# Patient Record
Sex: FEMALE | Race: WHITE | HISPANIC OR LATINO | Employment: OTHER | URBAN - METROPOLITAN AREA
[De-identification: names, ages, dates, MRNs, and addresses within clinical notes are randomized per-mention and may not be internally consistent; named-entity substitution may affect disease eponyms.]

---

## 2017-05-09 ENCOUNTER — ALLSCRIPTS OFFICE VISIT (OUTPATIENT)
Dept: OTHER | Facility: OTHER | Age: 62
End: 2017-05-09

## 2017-05-09 DIAGNOSIS — M79.671 PAIN OF RIGHT FOOT: ICD-10-CM

## 2017-05-09 DIAGNOSIS — Z12.31 ENCOUNTER FOR SCREENING MAMMOGRAM FOR MALIGNANT NEOPLASM OF BREAST: ICD-10-CM

## 2017-05-10 ENCOUNTER — TRANSCRIBE ORDERS (OUTPATIENT)
Dept: RADIOLOGY | Facility: HOSPITAL | Age: 62
End: 2017-05-10

## 2017-05-10 ENCOUNTER — HOSPITAL ENCOUNTER (OUTPATIENT)
Dept: RADIOLOGY | Facility: HOSPITAL | Age: 62
Discharge: HOME | End: 2017-05-10
Payer: MEDICARE

## 2017-05-10 ENCOUNTER — GENERIC CONVERSION - ENCOUNTER (OUTPATIENT)
Dept: OTHER | Facility: OTHER | Age: 62
End: 2017-05-10

## 2017-05-10 DIAGNOSIS — M79.671 PAIN OF RIGHT FOOT: ICD-10-CM

## 2017-05-10 PROCEDURE — 73610 X-RAY EXAM OF ANKLE: CPT

## 2017-05-10 PROCEDURE — 73630 X-RAY EXAM OF FOOT: CPT

## 2017-06-05 ENCOUNTER — ALLSCRIPTS OFFICE VISIT (OUTPATIENT)
Dept: OTHER | Facility: OTHER | Age: 62
End: 2017-06-05

## 2017-06-12 ENCOUNTER — HOSPITAL ENCOUNTER (OUTPATIENT)
Dept: RADIOLOGY | Facility: HOSPITAL | Age: 62
Discharge: HOME | End: 2017-06-12
Payer: MEDICARE

## 2017-06-12 DIAGNOSIS — Z12.31 ENCOUNTER FOR SCREENING MAMMOGRAM FOR MALIGNANT NEOPLASM OF BREAST: ICD-10-CM

## 2017-06-12 PROCEDURE — G0202 SCR MAMMO BI INCL CAD: HCPCS

## 2017-06-23 ENCOUNTER — ALLSCRIPTS OFFICE VISIT (OUTPATIENT)
Dept: OTHER | Facility: OTHER | Age: 62
End: 2017-06-23

## 2017-06-23 DIAGNOSIS — M79.671 PAIN OF RIGHT FOOT: ICD-10-CM

## 2017-06-30 ENCOUNTER — HOSPITAL ENCOUNTER (OUTPATIENT)
Dept: RADIOLOGY | Facility: HOSPITAL | Age: 62
Discharge: HOME | End: 2017-06-30
Payer: MEDICARE

## 2017-06-30 DIAGNOSIS — M79.671 PAIN OF RIGHT FOOT: ICD-10-CM

## 2017-06-30 PROCEDURE — 73718 MRI LOWER EXTREMITY W/O DYE: CPT

## 2017-07-18 ENCOUNTER — TRANSCRIBE ORDERS (OUTPATIENT)
Dept: ADMINISTRATIVE | Facility: HOSPITAL | Age: 62
End: 2017-07-18

## 2017-07-18 ENCOUNTER — ALLSCRIPTS OFFICE VISIT (OUTPATIENT)
Dept: OTHER | Facility: OTHER | Age: 62
End: 2017-07-18

## 2017-07-18 DIAGNOSIS — G57.81 SAPHENOUS NEURALGIA, RIGHT: Primary | ICD-10-CM

## 2017-11-20 ENCOUNTER — ALLSCRIPTS OFFICE VISIT (OUTPATIENT)
Dept: OTHER | Facility: OTHER | Age: 62
End: 2017-11-20

## 2017-12-01 ENCOUNTER — GENERIC CONVERSION - ENCOUNTER (OUTPATIENT)
Dept: OTHER | Facility: OTHER | Age: 62
End: 2017-12-01

## 2018-01-10 NOTE — MISCELLANEOUS
Reason For Visit  Reason For Visit Free Text Note Form: Assistance with Advanced Directives and transportation programs     Case Management Documentation [facility]:   Information obtained from the patient and medical record  Other needs and concerns include psych and Depression / anxiety  She is also dealing with additional issues such as chronic/terminal disease  Patient is participating in [employer], [place], Medicare and [employer] programs  Action Plan: supportive counseling/advocacy, information provided and Bereavement Counselor/ Groups  plan reviewed  Progress Note  CHRIS met with this 57 y/o Bilingual female pt this date for assistance with info re Advanced Directives and transportation/ community resources  Pt relates she is s/p aneurysm surgery  Pt has also suffered the loss of her dgt to cancer  Pt is very interested in Advanced Directives and SW has provided info including several samples of same  Pt to f/u with her family re same  Pt has also been taking care of her [employer] arrangements as she does not want to be a burden to her remaining children when the times comes  Pt also expressed interest in Grief and Bereavement services  CHRIS was able to help her get an appointment with Rubi Zamora our Hospice Bereavement Counselor at [phone]  Info on [address] provided but it is not on a good day for her  SW was also able to help pt locates Groups on the Grief Share Group Web site  Pt also relates she had been active at [facility] for counseling services/    CHRIS has also informed her of the transportation services available with her insurance coverage  CHRIS also provided her with a [employer] application with info on how to complete same  Pt to f/u with SW as needed  Active Problems    1  Benign essential hypertension (401 1) (I10)   2  Chronic pain in right foot (729 5,338 29) (M79 671,G89 29)   3  Depression with anxiety (300 4) (F41 8)   4  Obesity (278 00) (E66 9)   5   Peripheral edema (782 3) (R60 9)   6  Posterior communicating artery aneurysm (437 3) (I67 1)   7  Sural neuritis, right (355 79) (G57 81)   8  Varicose veins (454 9) (I83 90)   9  Visit for screening mammogram (V76 12) (Z12 31)   10  Vitamin D deficiency (268 9) (E55 9)    Current Meds   1  ALPRAZolam 0 5 MG Oral Tablet (Xanax); Take 1-0 5mg tablet before leaving the house   for EMG/nerve conduction study and take 1-0 5mg tablet just before having the test;   Therapy: 48TGA4164 to (Evaluate:51Azk0737); Last Rx:63Azf0195 Ordered   2  Atenolol-Chlorthalidone 50-25 MG Oral Tablet; TAKE ONE TABLET BY MOUTH TWICE A   DAY AS DIRECTED; Therapy: 14JWM0007 to (Evaluate:87Ibc3993)  Requested for: 01MWR0200; Last   Rx:15Thn0131 Ordered   3  Losartan Potassium 100 MG Oral Tablet; TAKE 1 TABLET ONCE DAILY; Therapy: 21IYA2263 to (Ricky Safer)  Requested for: 01AIH0119; Last   ZD:[id number] Ordered    Allergies    1  Percocet TABS   2   ACE Inhibitors    Future Appointments    Date/Time Provider Specialty Site   12/01/2017 12:50 PM Arpan Guzman 6 PCP   01/18/2018 08:00 AM Salomón Melara MD Neurology [facility] 21     Signatures   Electronically signed by : Peggy Borrego LCSW; Nov 20 2017  6:06PM EST                       (Author)

## 2018-01-12 VITALS
SYSTOLIC BLOOD PRESSURE: 198 MMHG | HEIGHT: 60 IN | DIASTOLIC BLOOD PRESSURE: 102 MMHG | TEMPERATURE: 98 F | BODY MASS INDEX: 37.05 KG/M2 | HEART RATE: 84 BPM | WEIGHT: 188.71 LBS

## 2018-01-13 VITALS
BODY MASS INDEX: 36.79 KG/M2 | DIASTOLIC BLOOD PRESSURE: 70 MMHG | TEMPERATURE: 97.7 F | HEART RATE: 72 BPM | SYSTOLIC BLOOD PRESSURE: 110 MMHG | WEIGHT: 187.39 LBS | HEIGHT: 60 IN

## 2018-01-13 VITALS
SYSTOLIC BLOOD PRESSURE: 120 MMHG | HEIGHT: 59 IN | BODY MASS INDEX: 37.51 KG/M2 | WEIGHT: 186.07 LBS | DIASTOLIC BLOOD PRESSURE: 80 MMHG | TEMPERATURE: 97.9 F | HEART RATE: 70 BPM

## 2018-01-14 VITALS
HEART RATE: 76 BPM | BODY MASS INDEX: 37.69 KG/M2 | SYSTOLIC BLOOD PRESSURE: 118 MMHG | HEIGHT: 59 IN | DIASTOLIC BLOOD PRESSURE: 78 MMHG | WEIGHT: 186.95 LBS | TEMPERATURE: 98.1 F

## 2018-01-17 ENCOUNTER — APPOINTMENT (OUTPATIENT)
Dept: PHYSICAL THERAPY | Facility: REHABILITATION | Age: 63
End: 2018-01-17
Payer: COMMERCIAL

## 2018-01-17 PROCEDURE — G8979 MOBILITY GOAL STATUS: HCPCS

## 2018-01-17 PROCEDURE — 97162 PT EVAL MOD COMPLEX 30 MIN: CPT

## 2018-01-17 PROCEDURE — G8978 MOBILITY CURRENT STATUS: HCPCS

## 2018-01-24 ENCOUNTER — OFFICE VISIT (OUTPATIENT)
Dept: PHYSICAL THERAPY | Facility: REHABILITATION | Age: 63
End: 2018-01-24
Payer: COMMERCIAL

## 2018-01-24 VITALS
WEIGHT: 188.71 LBS | SYSTOLIC BLOOD PRESSURE: 110 MMHG | DIASTOLIC BLOOD PRESSURE: 70 MMHG | BODY MASS INDEX: 37.05 KG/M2 | HEIGHT: 60 IN | TEMPERATURE: 97.8 F | HEART RATE: 60 BPM

## 2018-01-24 DIAGNOSIS — M67.88 RIGHT PERONEAL TENDINOSIS: Primary | ICD-10-CM

## 2018-01-24 PROCEDURE — 97140 MANUAL THERAPY 1/> REGIONS: CPT | Performed by: PHYSICAL THERAPIST

## 2018-01-24 NOTE — PROGRESS NOTES
Daily Note     Today's date: 2018  Patient name: Ayesha Domingo  : 1955  MRN: [id number]  Referring provider: Ness Ray DPM  Dx:   Encounter Diagnosis   Name Primary?  Right peroneal tendinosis Yes         Precautions: standard    Daily Treatment Diary     Manual              Right ankle & foot PROM LMR            1st digit PROM LMR            TCJ distraction (gr 1 & 2) LMR            Fig-8 calcaneal mobs LMR                                                                                              Exercise Diary                                                                                                                                                                                                                                                                                      Modalities                                                                     Subjective: (post-tx)  "it feels much looser "      Objective: See treatment diary below      Assessment: Tolerated treatment well  Patient could benefit from continued PT      Plan: Continue per plan of care  Update HEP with calf stretching and balance  Assess ANTT

## 2018-01-26 ENCOUNTER — APPOINTMENT (OUTPATIENT)
Dept: PHYSICAL THERAPY | Facility: REHABILITATION | Age: 63
End: 2018-01-26
Payer: COMMERCIAL

## 2018-01-29 ENCOUNTER — APPOINTMENT (OUTPATIENT)
Dept: PHYSICAL THERAPY | Facility: REHABILITATION | Age: 63
End: 2018-01-29
Payer: COMMERCIAL

## 2018-02-01 ENCOUNTER — APPOINTMENT (OUTPATIENT)
Dept: PHYSICAL THERAPY | Facility: REHABILITATION | Age: 63
End: 2018-02-01
Payer: COMMERCIAL

## 2018-02-02 ENCOUNTER — APPOINTMENT (OUTPATIENT)
Dept: PHYSICAL THERAPY | Facility: REHABILITATION | Age: 63
End: 2018-02-02
Payer: COMMERCIAL

## 2018-02-02 ENCOUNTER — OFFICE VISIT (OUTPATIENT)
Dept: PHYSICAL THERAPY | Facility: REHABILITATION | Age: 63
End: 2018-02-02
Payer: COMMERCIAL

## 2018-02-02 DIAGNOSIS — M67.88 RIGHT PERONEAL TENDINOSIS: Primary | ICD-10-CM

## 2018-02-02 PROCEDURE — G8981 BODY POS CURRENT STATUS: HCPCS | Performed by: PHYSICAL THERAPIST

## 2018-02-02 PROCEDURE — 97140 MANUAL THERAPY 1/> REGIONS: CPT | Performed by: PHYSICAL THERAPIST

## 2018-02-02 PROCEDURE — G8983 BODY POS D/C STATUS: HCPCS | Performed by: PHYSICAL THERAPIST

## 2018-02-02 PROCEDURE — 97110 THERAPEUTIC EXERCISES: CPT | Performed by: PHYSICAL THERAPIST

## 2018-02-02 PROCEDURE — G8982 BODY POS GOAL STATUS: HCPCS | Performed by: PHYSICAL THERAPIST

## 2018-02-02 NOTE — PROGRESS NOTES
Daily Note     Today's date: 2018  Patient name: Milton Peter  : 1955  MRN: [id number]  Referring provider: Jayde Tinoco DPM  Dx:   Encounter Diagnosis   Name Primary?  Right peroneal tendinosis Yes       Start Time: 945  Stop Time:   Total time in clinic (min): 45 minutes    Subjective: I felt good after last tx then pain returned the next day  Objective: See treatment diary below      Assessment: Tolerated treatment well  Patient would benefit from continued PT      Plan: Continue per plan of care  and Progress treatment as tolerated       Precautions: standard     Daily Treatment Diary      Manual    2-2                   Right ankle & foot PROM LMR  LB                   1st digit PROM LMR  LB                   TCJ distraction (gr 1 & 2) LMR  LB                   Fig-8 calcaneal mobs LMR  LR                                                                                                                                                                         Exercise Diary   2-2                       bike   8 min                     slant board stretch  3x30"                     Wobble board   DF/PF/IN/EV  3x10                      Tanacross wobble  c/cc   2x20                                                                                                                                                                                                                                                                                                                                                                                                                           Modalities

## 2018-02-05 ENCOUNTER — APPOINTMENT (OUTPATIENT)
Dept: PHYSICAL THERAPY | Facility: REHABILITATION | Age: 63
End: 2018-02-05
Payer: COMMERCIAL

## 2018-02-09 ENCOUNTER — APPOINTMENT (OUTPATIENT)
Dept: PHYSICAL THERAPY | Facility: REHABILITATION | Age: 63
End: 2018-02-09
Payer: COMMERCIAL

## 2018-02-12 ENCOUNTER — APPOINTMENT (OUTPATIENT)
Dept: PHYSICAL THERAPY | Facility: REHABILITATION | Age: 63
End: 2018-02-12
Payer: COMMERCIAL

## 2018-02-19 NOTE — PROGRESS NOTES
Pt has been phoned 3x and made aware of the attendance policy and has cx and no showed greater than 3x  Pt is Dc'd at this time

## 2018-02-22 ENCOUNTER — TELEPHONE (OUTPATIENT)
Dept: INTERNAL MEDICINE CLINIC | Facility: CLINIC | Age: 63
End: 2018-02-22

## 2018-02-22 DIAGNOSIS — M25.50 ARTHRALGIA, UNSPECIFIED JOINT: ICD-10-CM

## 2018-02-22 DIAGNOSIS — R53.82 CHRONIC FATIGUE: Primary | ICD-10-CM

## 2018-02-22 RX ORDER — CHOLECALCIFEROL (VITAMIN D3) 25 MCG
1000 CAPSULE ORAL DAILY
Refills: 0 | COMMUNITY
Start: 2017-12-01 | End: 2018-07-03 | Stop reason: SDUPTHER

## 2018-02-22 RX ORDER — SERTRALINE HYDROCHLORIDE 100 MG/1
TABLET, FILM COATED ORAL
COMMUNITY
Start: 2018-02-21 | End: 2019-03-14

## 2018-02-22 RX ORDER — BIOTIN 1 MG
1 TABLET ORAL DAILY
COMMUNITY
Start: 2017-12-01 | End: 2019-03-14

## 2018-02-22 RX ORDER — LOSARTAN POTASSIUM 100 MG/1
1 TABLET ORAL DAILY
COMMUNITY
Start: 2017-05-09 | End: 2018-07-18 | Stop reason: SDUPTHER

## 2018-02-22 RX ORDER — ATENOLOL AND CHLORTHALIDONE TABLET 50; 25 MG/1; MG/1
1 TABLET ORAL 2 TIMES DAILY
COMMUNITY
Start: 2017-05-09 | End: 2018-09-14 | Stop reason: SDUPTHER

## 2018-02-22 NOTE — TELEPHONE ENCOUNTER
Advise patient there are NO labs to screen for cancer at this time, but I have ordered labs that if are abnormal would indicate there could be other issues   Please get the additional labs completed and we will call if need to be seen sooner

## 2018-02-23 ENCOUNTER — APPOINTMENT (OUTPATIENT)
Dept: PHYSICAL THERAPY | Facility: REHABILITATION | Age: 63
End: 2018-02-23
Payer: COMMERCIAL

## 2018-02-26 ENCOUNTER — APPOINTMENT (OUTPATIENT)
Dept: PHYSICAL THERAPY | Facility: REHABILITATION | Age: 63
End: 2018-02-26
Payer: COMMERCIAL

## 2018-02-28 NOTE — PROGRESS NOTES
Left reminder for patient to have her NAGA SCREEN W/REFLEX TO TITER/PATTERN, TSH 3RD GENERATION WITH T4 REFLEX, CYCLIC CITRUL PEPTIDE ANTIBODY lgG, RF SCREEN W REFLEX TITER AND CBC DIFFERENTIAL

## 2018-03-01 DIAGNOSIS — I10 ESSENTIAL (PRIMARY) HYPERTENSION: ICD-10-CM

## 2018-03-26 ENCOUNTER — APPOINTMENT (OUTPATIENT)
Dept: LAB | Facility: HOSPITAL | Age: 63
End: 2018-03-26
Payer: COMMERCIAL

## 2018-03-26 DIAGNOSIS — I10 ESSENTIAL (PRIMARY) HYPERTENSION: ICD-10-CM

## 2018-03-26 LAB
ALBUMIN SERPL BCP-MCNC: 3.4 G/DL (ref 3.5–5)
ALP SERPL-CCNC: 72 U/L (ref 46–116)
ALT SERPL W P-5'-P-CCNC: 19 U/L (ref 12–78)
ANION GAP SERPL CALCULATED.3IONS-SCNC: 5 MMOL/L (ref 4–13)
AST SERPL W P-5'-P-CCNC: 13 U/L (ref 5–45)
BASOPHILS # BLD AUTO: 0.04 THOUSANDS/ΜL (ref 0–0.1)
BASOPHILS NFR BLD AUTO: 1 % (ref 0–1)
BILIRUB SERPL-MCNC: 0.3 MG/DL (ref 0.2–1)
BUN SERPL-MCNC: 29 MG/DL (ref 5–25)
CALCIUM SERPL-MCNC: 9.2 MG/DL (ref 8.3–10.1)
CHLORIDE SERPL-SCNC: 105 MMOL/L (ref 100–108)
CHOLEST SERPL-MCNC: 196 MG/DL (ref 50–200)
CO2 SERPL-SCNC: 31 MMOL/L (ref 21–32)
CREAT SERPL-MCNC: 1.2 MG/DL (ref 0.6–1.3)
CREAT UR-MCNC: 107 MG/DL
EOSINOPHIL # BLD AUTO: 0.21 THOUSAND/ΜL (ref 0–0.61)
EOSINOPHIL NFR BLD AUTO: 3 % (ref 0–6)
ERYTHROCYTE [DISTWIDTH] IN BLOOD BY AUTOMATED COUNT: 13.4 % (ref 11.6–15.1)
GFR SERPL CREATININE-BSD FRML MDRD: 48 ML/MIN/1.73SQ M
GLUCOSE P FAST SERPL-MCNC: 100 MG/DL (ref 65–99)
HCT VFR BLD AUTO: 41.4 % (ref 34.8–46.1)
HDLC SERPL-MCNC: 47 MG/DL (ref 40–60)
HGB BLD-MCNC: 13.8 G/DL (ref 11.5–15.4)
LDLC SERPL CALC-MCNC: 121 MG/DL (ref 0–100)
LYMPHOCYTES # BLD AUTO: 2.13 THOUSANDS/ΜL (ref 0.6–4.47)
LYMPHOCYTES NFR BLD AUTO: 28 % (ref 14–44)
MCH RBC QN AUTO: 31.2 PG (ref 26.8–34.3)
MCHC RBC AUTO-ENTMCNC: 33.3 G/DL (ref 31.4–37.4)
MCV RBC AUTO: 94 FL (ref 82–98)
MICROALBUMIN UR-MCNC: <5 MG/L (ref 0–20)
MICROALBUMIN/CREAT 24H UR: <5 MG/G CREATININE (ref 0–30)
MONOCYTES # BLD AUTO: 0.4 THOUSAND/ΜL (ref 0.17–1.22)
MONOCYTES NFR BLD AUTO: 5 % (ref 4–12)
NEUTROPHILS # BLD AUTO: 4.88 THOUSANDS/ΜL (ref 1.85–7.62)
NEUTS SEG NFR BLD AUTO: 63 % (ref 43–75)
NRBC BLD AUTO-RTO: 0 /100 WBCS
PLATELET # BLD AUTO: 236 THOUSANDS/UL (ref 149–390)
PMV BLD AUTO: 11.5 FL (ref 8.9–12.7)
POTASSIUM SERPL-SCNC: 3.6 MMOL/L (ref 3.5–5.3)
PROT SERPL-MCNC: 7.3 G/DL (ref 6.4–8.2)
RBC # BLD AUTO: 4.42 MILLION/UL (ref 3.81–5.12)
RHEUMATOID FACT SER QL LA: NEGATIVE
SODIUM SERPL-SCNC: 141 MMOL/L (ref 136–145)
TRIGL SERPL-MCNC: 139 MG/DL
TSH SERPL DL<=0.05 MIU/L-ACNC: 3.71 UIU/ML (ref 0.36–3.74)
WBC # BLD AUTO: 7.69 THOUSAND/UL (ref 4.31–10.16)

## 2018-03-26 PROCEDURE — 86430 RHEUMATOID FACTOR TEST QUAL: CPT | Performed by: PHYSICIAN ASSISTANT

## 2018-03-26 PROCEDURE — 85025 COMPLETE CBC W/AUTO DIFF WBC: CPT | Performed by: PHYSICIAN ASSISTANT

## 2018-03-26 PROCEDURE — 82043 UR ALBUMIN QUANTITATIVE: CPT

## 2018-03-26 PROCEDURE — 80061 LIPID PANEL: CPT

## 2018-03-26 PROCEDURE — 86200 CCP ANTIBODY: CPT | Performed by: PHYSICIAN ASSISTANT

## 2018-03-26 PROCEDURE — 84443 ASSAY THYROID STIM HORMONE: CPT | Performed by: PHYSICIAN ASSISTANT

## 2018-03-26 PROCEDURE — 80053 COMPREHEN METABOLIC PANEL: CPT

## 2018-03-26 PROCEDURE — 86038 ANTINUCLEAR ANTIBODIES: CPT | Performed by: PHYSICIAN ASSISTANT

## 2018-03-26 PROCEDURE — 82570 ASSAY OF URINE CREATININE: CPT

## 2018-03-26 PROCEDURE — 36415 COLL VENOUS BLD VENIPUNCTURE: CPT | Performed by: PHYSICIAN ASSISTANT

## 2018-03-28 LAB
CCP IGA+IGG SERPL IA-ACNC: 4 UNITS (ref 0–19)
RYE IGE QN: NEGATIVE

## 2018-04-06 RX ORDER — RISPERIDONE 1 MG/1
1 TABLET, FILM COATED ORAL 2 TIMES DAILY
Refills: 0 | COMMUNITY
Start: 2018-03-06 | End: 2019-03-14

## 2018-04-06 RX ORDER — ZOLPIDEM TARTRATE 10 MG/1
TABLET ORAL
Refills: 0 | COMMUNITY
Start: 2018-03-01

## 2018-04-06 RX ORDER — LORAZEPAM 1 MG/1
TABLET ORAL
Refills: 0 | COMMUNITY
Start: 2018-03-01

## 2018-04-07 PROBLEM — I67.1 POSTERIOR COMMUNICATING ARTERY ANEURYSM: Status: ACTIVE | Noted: 2017-05-09

## 2018-04-07 PROBLEM — E55.9 VITAMIN D DEFICIENCY: Status: ACTIVE | Noted: 2017-05-09

## 2018-04-07 PROBLEM — Z12.11 COLON CANCER SCREENING: Status: ACTIVE | Noted: 2018-04-07

## 2018-04-07 PROBLEM — F41.8 DEPRESSION WITH ANXIETY: Status: ACTIVE | Noted: 2017-05-09

## 2018-04-07 PROBLEM — G57.81 SURAL NEURITIS, RIGHT: Status: ACTIVE | Noted: 2017-07-18

## 2018-04-07 PROBLEM — G89.29 CHRONIC PAIN IN RIGHT FOOT: Status: ACTIVE | Noted: 2017-05-09

## 2018-04-07 PROBLEM — N18.31 CKD STAGE G3A/A1, GFR 45-59 AND ALBUMIN CREATININE RATIO <30 MG/G (HCC): Status: ACTIVE | Noted: 2018-04-07

## 2018-04-07 PROBLEM — I83.90 VARICOSE VEINS: Status: ACTIVE | Noted: 2017-06-23

## 2018-04-07 PROBLEM — M79.671 CHRONIC PAIN IN RIGHT FOOT: Status: ACTIVE | Noted: 2017-05-09

## 2018-04-07 PROBLEM — M25.50 ARTHRALGIA: Status: RESOLVED | Noted: 2018-02-22 | Resolved: 2018-04-07

## 2018-04-07 PROBLEM — R53.82 CHRONIC FATIGUE: Status: RESOLVED | Noted: 2018-02-22 | Resolved: 2018-04-07

## 2018-04-07 PROBLEM — I10 BENIGN ESSENTIAL HYPERTENSION: Status: ACTIVE | Noted: 2017-05-09

## 2018-04-08 NOTE — PROGRESS NOTES
Assessment/Plan:  Do not take any NSAID or ASA  Tylenol only if needed  Continue BP meds as well controlled BP helps prevent more damage to your kidneys  Sched mammogram in June  Colonoscopy due 2021  Continue routine GYN care  Appt in 6 months, get labs completed priot to visit    Colon cancer screening  Due 11/2021       Diagnoses and all orders for this visit:    Screening for colon cancer    Essential hypertension  -     Comprehensive metabolic panel; Future  -     Protein / creatinine ratio, urine; Future    Benign essential hypertension    Depression with anxiety    Chronic pain in right foot  -     acetaminophen (TYLENOL) 500 mg tablet; Take 1 tablet (500 mg total) by mouth every 6 (six) hours as needed for moderate pain or headaches for up to 60 days    CKD stage G3a/A1, GFR 45-59 and albumin creatinine ratio <30 mg/g  -     Comprehensive metabolic panel; Future  -     Protein / creatinine ratio, urine; Future    Visit for screening mammogram  -     Mammo screening bilateral w cad; Future    Other orders  -     LORazepam (ATIVAN) 1 mg tablet; take 1 tablet by mouth daily (TAKE AS NEEDED)  -     risperiDONE (RisperDAL) 1 mg tablet; Take 1 mg by mouth 2 (two) times a day  -     zolpidem (AMBIEN) 10 mg tablet; take 1 tablet by mouth at bedtime for ( TAKE FOR SLEEP)  -     Cancel: Occult Bloood,Fecal Immunochemical; Future  -     Cancel: Flu vaccine greater than or equal to 4yo preservative free IM  -     Cancel: TDAP VACCINE GREATER THAN OR EQUAL TO 6YO IM          Subjective:      Patient ID: Magno Vee is a 61 y o  female  Pt coming in for lab review     Labs completed 3/2018  Autoimmune testing all negative   and   Glucose 100, also note BUN elevated at 29, creatinine normal at 1 2 with reduced eGFR 48  Patient had been taking a lot of ibuprofen in the past, also review shows BP has been elevated frequently in the past   Was advised in past to avoid ASA due to previous [facility] and surgery for aneurysm  Patient was cleared by neuro surgery and advised that she did not need any additional followups  Colonoscopy 11/2016 at [facility] 1 polyp, scattered diverticulosis and small internal hemorrhoids  Mammo due June 2018    Pt is going for PT for chronic foot and ankle pain  Did meet with  for assistance getting in with grief counseling  Last visit was anniversary of daughters passing  At [facility] 12/17 patient was supposed to see neurologist 1/2018 for migraines  Patient reports since her blood pressure has been controlled she has not been having any headaches  States continues to follow with  but admits taking the ativan and [place] as needed, states therapist is aware  Confirms no allergy to tylenol only had reaction to oxycodone, confirms has taken tylenol alone and no problems  Has been taking advil / ibuprofen OTC        The following portions of the patient's history were reviewed and updated as appropriate: allergies, current medications, past family history, past medical history, past social history, past surgical history and problem list     Review of Systems   Constitutional: Positive for fatigue  HENT: Negative  Respiratory: Negative  Negative for cough  Cardiovascular: Negative  Negative for chest pain and palpitations  Gastrointestinal: Negative  Skin: Negative  Neurological: Negative  Psychiatric/Behavioral: Positive for sleep disturbance  Negative for self-injury and suicidal ideas  As noted in HPI         Objective:      /74 (BP Location: Right arm, Patient Position: Sitting, Cuff Size: Large)   Pulse 84   Temp 97 5 °F (36 4 °C) (Oral)   Ht 5' 0 5" (1 537 m)   Wt 87 kg (191 lb 12 8 oz)   BMI 36 84 kg/m²          Physical Exam   Constitutional: She is oriented to person, place, and time  She appears well-developed and well-nourished  HENT:   Head: Normocephalic  Nose: Mucosal edema present     Mouth/Throat: Oropharynx is clear and moist and mucous membranes are normal  Abnormal dentition  No dental abscesses  Cardiovascular: Normal rate, regular rhythm and normal heart sounds  No murmur heard  Pulmonary/Chest: Effort normal and breath sounds normal  She has no wheezes  She has no rales  Abdominal: Soft  Bowel sounds are normal    Musculoskeletal: Normal range of motion  Neurological: She is alert and oriented to person, place, and time  Skin: Skin is warm and dry  Psychiatric: She has a normal mood and affect  Her speech is normal and behavior is normal  Judgment and thought content normal  Cognition and memory are normal    Patient did need to be redirected during the visit  Patient however could verbalized all instructions and repeat them back and understood information

## 2018-04-09 ENCOUNTER — OFFICE VISIT (OUTPATIENT)
Dept: INTERNAL MEDICINE CLINIC | Facility: CLINIC | Age: 63
End: 2018-04-09
Payer: COMMERCIAL

## 2018-04-09 VITALS
SYSTOLIC BLOOD PRESSURE: 110 MMHG | WEIGHT: 191.8 LBS | TEMPERATURE: 97.5 F | HEART RATE: 84 BPM | DIASTOLIC BLOOD PRESSURE: 74 MMHG | HEIGHT: 61 IN | BODY MASS INDEX: 36.21 KG/M2

## 2018-04-09 DIAGNOSIS — G89.29 CHRONIC PAIN IN RIGHT FOOT: ICD-10-CM

## 2018-04-09 DIAGNOSIS — F41.8 DEPRESSION WITH ANXIETY: ICD-10-CM

## 2018-04-09 DIAGNOSIS — Z12.11 SCREENING FOR COLON CANCER: Primary | ICD-10-CM

## 2018-04-09 DIAGNOSIS — N18.31 CKD STAGE G3A/A1, GFR 45-59 AND ALBUMIN CREATININE RATIO <30 MG/G (HCC): ICD-10-CM

## 2018-04-09 DIAGNOSIS — I10 BENIGN ESSENTIAL HYPERTENSION: ICD-10-CM

## 2018-04-09 DIAGNOSIS — M79.671 CHRONIC PAIN IN RIGHT FOOT: ICD-10-CM

## 2018-04-09 DIAGNOSIS — I10 ESSENTIAL HYPERTENSION: ICD-10-CM

## 2018-04-09 DIAGNOSIS — Z12.31 VISIT FOR SCREENING MAMMOGRAM: ICD-10-CM

## 2018-04-09 PROCEDURE — 3008F BODY MASS INDEX DOCD: CPT | Performed by: PHYSICIAN ASSISTANT

## 2018-04-09 PROCEDURE — 99213 OFFICE O/P EST LOW 20 MIN: CPT | Performed by: PHYSICIAN ASSISTANT

## 2018-04-09 PROCEDURE — 1036F TOBACCO NON-USER: CPT | Performed by: PHYSICIAN ASSISTANT

## 2018-04-09 RX ORDER — ACETAMINOPHEN 500 MG
500 TABLET ORAL EVERY 6 HOURS PRN
Qty: 30 TABLET | Refills: 1 | Status: SHIPPED | OUTPATIENT
Start: 2018-04-09 | End: 2018-06-08

## 2018-07-03 DIAGNOSIS — E55.9 VITAMIN D DEFICIENCY: Primary | ICD-10-CM

## 2018-07-03 RX ORDER — CHOLECALCIFEROL (VITAMIN D3) 25 MCG
1000 CAPSULE ORAL DAILY
Qty: 90 CAPSULE | Refills: 1 | Status: SHIPPED | OUTPATIENT
Start: 2018-07-03 | End: 2019-03-14

## 2018-07-18 DIAGNOSIS — I10 ESSENTIAL HYPERTENSION: Primary | ICD-10-CM

## 2018-07-18 RX ORDER — LOSARTAN POTASSIUM 100 MG/1
TABLET ORAL
Qty: 90 TABLET | Refills: 1 | Status: SHIPPED | OUTPATIENT
Start: 2018-07-18 | End: 2019-03-14

## 2018-08-27 ENCOUNTER — TRANSCRIBE ORDERS (OUTPATIENT)
Dept: RADIOLOGY | Facility: HOSPITAL | Age: 63
End: 2018-08-27

## 2018-08-27 ENCOUNTER — HOSPITAL ENCOUNTER (OUTPATIENT)
Dept: RADIOLOGY | Facility: HOSPITAL | Age: 63
Discharge: HOME | End: 2018-08-27
Payer: COMMERCIAL

## 2018-08-27 ENCOUNTER — TRANSCRIBE ORDERS (OUTPATIENT)
Dept: LAB | Facility: HOSPITAL | Age: 63
End: 2018-08-27

## 2018-08-27 ENCOUNTER — LAB (OUTPATIENT)
Dept: LAB | Facility: HOSPITAL | Age: 63
End: 2018-08-27
Payer: COMMERCIAL

## 2018-08-27 ENCOUNTER — OFFICE VISIT (OUTPATIENT)
Dept: LAB | Facility: HOSPITAL | Age: 63
End: 2018-08-27
Payer: COMMERCIAL

## 2018-08-27 DIAGNOSIS — R07.9 CHEST PAIN, UNSPECIFIED TYPE: ICD-10-CM

## 2018-08-27 DIAGNOSIS — E66.9 OBESITY, UNSPECIFIED CLASSIFICATION, UNSPECIFIED OBESITY TYPE, UNSPECIFIED WHETHER SERIOUS COMORBIDITY PRESENT: ICD-10-CM

## 2018-08-27 DIAGNOSIS — I10 ESSENTIAL HYPERTENSION, MALIGNANT: ICD-10-CM

## 2018-08-27 DIAGNOSIS — I10 BENIGN HYPERTENSION: Primary | ICD-10-CM

## 2018-08-27 DIAGNOSIS — E66.9 OBESITY, UNSPECIFIED CLASSIFICATION, UNSPECIFIED OBESITY TYPE, UNSPECIFIED WHETHER SERIOUS COMORBIDITY PRESENT: Primary | ICD-10-CM

## 2018-08-27 LAB
ALBUMIN SERPL BCP-MCNC: 3.4 G/DL (ref 3.5–5)
ALP SERPL-CCNC: 63 U/L (ref 46–116)
ALT SERPL W P-5'-P-CCNC: 18 U/L (ref 12–78)
ANION GAP SERPL CALCULATED.3IONS-SCNC: 7 MMOL/L (ref 4–13)
AST SERPL W P-5'-P-CCNC: 16 U/L (ref 5–45)
ATRIAL RATE: 49 BPM
BACTERIA UR QL AUTO: ABNORMAL /HPF
BASOPHILS # BLD AUTO: 0.06 THOUSANDS/ΜL (ref 0–0.1)
BASOPHILS NFR BLD AUTO: 1 % (ref 0–1)
BILIRUB SERPL-MCNC: 0.46 MG/DL (ref 0.2–1)
BILIRUB UR QL STRIP: NEGATIVE
BUN SERPL-MCNC: 23 MG/DL (ref 5–25)
CALCIUM SERPL-MCNC: 8.9 MG/DL (ref 8.3–10.1)
CHLORIDE SERPL-SCNC: 104 MMOL/L (ref 100–108)
CHOLEST SERPL-MCNC: 211 MG/DL (ref 50–200)
CLARITY UR: ABNORMAL
CO2 SERPL-SCNC: 28 MMOL/L (ref 21–32)
COLOR UR: ABNORMAL
CREAT SERPL-MCNC: 1.21 MG/DL (ref 0.6–1.3)
EOSINOPHIL # BLD AUTO: 0.22 THOUSAND/ΜL (ref 0–0.61)
EOSINOPHIL NFR BLD AUTO: 3 % (ref 0–6)
ERYTHROCYTE [DISTWIDTH] IN BLOOD BY AUTOMATED COUNT: 13.2 % (ref 11.6–15.1)
EST. AVERAGE GLUCOSE BLD GHB EST-MCNC: 117 MG/DL
GFR SERPL CREATININE-BSD FRML MDRD: 48 ML/MIN/1.73SQ M
GLUCOSE P FAST SERPL-MCNC: 96 MG/DL (ref 65–99)
GLUCOSE UR STRIP-MCNC: NEGATIVE MG/DL
HBA1C MFR BLD: 5.7 % (ref 4.2–6.3)
HCT VFR BLD AUTO: 41.2 % (ref 34.8–46.1)
HDLC SERPL-MCNC: 45 MG/DL (ref 40–60)
HGB BLD-MCNC: 13.4 G/DL (ref 11.5–15.4)
HGB UR QL STRIP.AUTO: NEGATIVE
HYALINE CASTS #/AREA URNS LPF: ABNORMAL /LPF
IMM GRANULOCYTES # BLD AUTO: 0.03 THOUSAND/UL (ref 0–0.2)
IMM GRANULOCYTES NFR BLD AUTO: 0 % (ref 0–2)
KETONES UR STRIP-MCNC: NEGATIVE MG/DL
LDLC SERPL CALC-MCNC: 138 MG/DL (ref 0–100)
LEUKOCYTE ESTERASE UR QL STRIP: ABNORMAL
LYMPHOCYTES # BLD AUTO: 1.58 THOUSANDS/ΜL (ref 0.6–4.47)
LYMPHOCYTES NFR BLD AUTO: 21 % (ref 14–44)
MCH RBC QN AUTO: 30.7 PG (ref 26.8–34.3)
MCHC RBC AUTO-ENTMCNC: 32.5 G/DL (ref 31.4–37.4)
MCV RBC AUTO: 95 FL (ref 82–98)
MONOCYTES # BLD AUTO: 0.63 THOUSAND/ΜL (ref 0.17–1.22)
MONOCYTES NFR BLD AUTO: 9 % (ref 4–12)
NEUTROPHILS # BLD AUTO: 4.91 THOUSANDS/ΜL (ref 1.85–7.62)
NEUTS SEG NFR BLD AUTO: 66 % (ref 43–75)
NITRITE UR QL STRIP: NEGATIVE
NON-SQ EPI CELLS URNS QL MICRO: ABNORMAL /HPF
NONHDLC SERPL-MCNC: 166 MG/DL
NRBC BLD AUTO-RTO: 0 /100 WBCS
P AXIS: 48 DEGREES
PH UR STRIP.AUTO: 5.5 [PH] (ref 4.5–8)
PLATELET # BLD AUTO: 210 THOUSANDS/UL (ref 149–390)
PMV BLD AUTO: 11.7 FL (ref 8.9–12.7)
POTASSIUM SERPL-SCNC: 2.9 MMOL/L (ref 3.5–5.3)
PR INTERVAL: 172 MS
PROT SERPL-MCNC: 7.1 G/DL (ref 6.4–8.2)
PROT UR STRIP-MCNC: NEGATIVE MG/DL
QRS AXIS: 5 DEGREES
QRSD INTERVAL: 90 MS
QT INTERVAL: 446 MS
QTC INTERVAL: 402 MS
RBC # BLD AUTO: 4.36 MILLION/UL (ref 3.81–5.12)
RBC #/AREA URNS AUTO: ABNORMAL /HPF
SODIUM SERPL-SCNC: 139 MMOL/L (ref 136–145)
SP GR UR STRIP.AUTO: 1.02 (ref 1–1.03)
T WAVE AXIS: 15 DEGREES
TRIGL SERPL-MCNC: 140 MG/DL
UROBILINOGEN UR QL STRIP.AUTO: 0.2 E.U./DL
VENTRICULAR RATE: 49 BPM
WBC # BLD AUTO: 7.43 THOUSAND/UL (ref 4.31–10.16)
WBC #/AREA URNS AUTO: ABNORMAL /HPF

## 2018-08-27 PROCEDURE — 81001 URINALYSIS AUTO W/SCOPE: CPT

## 2018-08-27 PROCEDURE — 93010 ELECTROCARDIOGRAM REPORT: CPT | Performed by: INTERNAL MEDICINE

## 2018-08-27 PROCEDURE — 80061 LIPID PANEL: CPT

## 2018-08-27 PROCEDURE — 36415 COLL VENOUS BLD VENIPUNCTURE: CPT

## 2018-08-27 PROCEDURE — 71046 X-RAY EXAM CHEST 2 VIEWS: CPT

## 2018-08-27 PROCEDURE — 93005 ELECTROCARDIOGRAM TRACING: CPT

## 2018-08-27 PROCEDURE — 85025 COMPLETE CBC W/AUTO DIFF WBC: CPT

## 2018-08-27 PROCEDURE — 83036 HEMOGLOBIN GLYCOSYLATED A1C: CPT

## 2018-08-27 PROCEDURE — 80053 COMPREHEN METABOLIC PANEL: CPT

## 2018-09-05 ENCOUNTER — TRANSCRIBE ORDERS (OUTPATIENT)
Dept: ADMINISTRATIVE | Facility: HOSPITAL | Age: 63
End: 2018-09-05

## 2018-09-05 DIAGNOSIS — R94.31 ABNORMAL EKG: Primary | ICD-10-CM

## 2018-09-10 ENCOUNTER — HOSPITAL ENCOUNTER (OUTPATIENT)
Dept: RADIOLOGY | Facility: HOSPITAL | Age: 63
Discharge: HOME | End: 2018-09-10
Payer: COMMERCIAL

## 2018-09-10 ENCOUNTER — APPOINTMENT (OUTPATIENT)
Dept: LAB | Facility: HOSPITAL | Age: 63
End: 2018-09-10
Payer: COMMERCIAL

## 2018-09-10 DIAGNOSIS — R94.31 ABNORMAL EKG: ICD-10-CM

## 2018-09-10 DIAGNOSIS — R10.11 ABDOMINAL PAIN, RIGHT UPPER QUADRANT: ICD-10-CM

## 2018-09-10 LAB — MAGNESIUM SERPL-MCNC: 2.3 MG/DL (ref 1.6–2.6)

## 2018-09-10 PROCEDURE — 36415 COLL VENOUS BLD VENIPUNCTURE: CPT

## 2018-09-10 PROCEDURE — 76705 ECHO EXAM OF ABDOMEN: CPT

## 2018-09-10 PROCEDURE — 83735 ASSAY OF MAGNESIUM: CPT

## 2018-09-14 DIAGNOSIS — I10 ESSENTIAL HYPERTENSION: Primary | ICD-10-CM

## 2018-09-14 RX ORDER — ATENOLOL AND CHLORTHALIDONE TABLET 50; 25 MG/1; MG/1
TABLET ORAL
Qty: 180 TABLET | Refills: 3 | Status: SHIPPED | OUTPATIENT
Start: 2018-09-14 | End: 2019-03-14

## 2018-09-25 ENCOUNTER — HOSPITAL ENCOUNTER (OUTPATIENT)
Dept: NON INVASIVE DIAGNOSTICS | Facility: HOSPITAL | Age: 63
Discharge: HOME | End: 2018-09-25
Payer: COMMERCIAL

## 2018-09-25 DIAGNOSIS — R94.31 ABNORMAL EKG: ICD-10-CM

## 2018-09-25 PROCEDURE — 93226 XTRNL ECG REC<48 HR SCAN A/R: CPT

## 2018-09-25 PROCEDURE — 93225 XTRNL ECG REC<48 HRS REC: CPT

## 2018-09-25 PROCEDURE — 93306 TTE W/DOPPLER COMPLETE: CPT | Performed by: INTERNAL MEDICINE

## 2018-09-25 PROCEDURE — 93306 TTE W/DOPPLER COMPLETE: CPT

## 2018-09-26 PROCEDURE — 93227 XTRNL ECG REC<48 HR R&I: CPT | Performed by: INTERNAL MEDICINE

## 2018-10-11 ENCOUNTER — TELEPHONE (OUTPATIENT)
Dept: INTERNAL MEDICINE CLINIC | Facility: CLINIC | Age: 63
End: 2018-10-11

## 2018-10-12 ENCOUNTER — TRANSCRIBE ORDERS (OUTPATIENT)
Dept: RADIOLOGY | Facility: HOSPITAL | Age: 63
End: 2018-10-12

## 2018-10-12 ENCOUNTER — HOSPITAL ENCOUNTER (OUTPATIENT)
Dept: RADIOLOGY | Facility: HOSPITAL | Age: 63
Discharge: HOME | End: 2018-10-12
Payer: COMMERCIAL

## 2018-10-12 DIAGNOSIS — M54.9 BACK PAIN, UNSPECIFIED BACK LOCATION, UNSPECIFIED BACK PAIN LATERALITY, UNSPECIFIED CHRONICITY: Primary | ICD-10-CM

## 2018-10-12 DIAGNOSIS — M54.9 BACK PAIN, UNSPECIFIED BACK LOCATION, UNSPECIFIED BACK PAIN LATERALITY, UNSPECIFIED CHRONICITY: ICD-10-CM

## 2018-10-12 PROCEDURE — 72114 X-RAY EXAM L-S SPINE BENDING: CPT

## 2018-11-21 ENCOUNTER — TRANSCRIBE ORDERS (OUTPATIENT)
Dept: ADMINISTRATIVE | Facility: HOSPITAL | Age: 63
End: 2018-11-21

## 2018-11-21 DIAGNOSIS — I67.1 CEREBRAL ANEURYSM, NONRUPTURED: ICD-10-CM

## 2018-11-21 DIAGNOSIS — R55 SYNCOPE AND COLLAPSE: Primary | ICD-10-CM

## 2018-11-21 DIAGNOSIS — R55 NEAR SYNCOPE: Primary | ICD-10-CM

## 2018-12-05 ENCOUNTER — HOSPITAL ENCOUNTER (OUTPATIENT)
Dept: NON INVASIVE DIAGNOSTICS | Facility: CLINIC | Age: 63
Discharge: HOME | End: 2018-12-05
Payer: COMMERCIAL

## 2018-12-05 DIAGNOSIS — I67.1 CEREBRAL ANEURYSM, NONRUPTURED: ICD-10-CM

## 2018-12-05 DIAGNOSIS — R55 NEAR SYNCOPE: ICD-10-CM

## 2018-12-05 PROCEDURE — 93880 EXTRACRANIAL BILAT STUDY: CPT

## 2018-12-05 PROCEDURE — 93880 EXTRACRANIAL BILAT STUDY: CPT | Performed by: RADIOLOGY

## 2018-12-06 ENCOUNTER — TRANSCRIBE ORDERS (OUTPATIENT)
Dept: LAB | Facility: HOSPITAL | Age: 63
End: 2018-12-06

## 2018-12-06 ENCOUNTER — APPOINTMENT (OUTPATIENT)
Dept: LAB | Facility: HOSPITAL | Age: 63
End: 2018-12-06
Payer: COMMERCIAL

## 2018-12-06 DIAGNOSIS — R55 SYNCOPE AND COLLAPSE: Primary | ICD-10-CM

## 2018-12-06 DIAGNOSIS — I67.1 CEREBRAL ANEURYSM, NONRUPTURED: ICD-10-CM

## 2018-12-06 LAB
ANION GAP SERPL CALCULATED.3IONS-SCNC: 5 MMOL/L (ref 4–13)
BUN SERPL-MCNC: 17 MG/DL (ref 5–25)
CALCIUM SERPL-MCNC: 9.7 MG/DL (ref 8.3–10.1)
CHLORIDE SERPL-SCNC: 108 MMOL/L (ref 100–108)
CO2 SERPL-SCNC: 25 MMOL/L (ref 21–32)
CREAT SERPL-MCNC: 1.03 MG/DL (ref 0.6–1.3)
GFR SERPL CREATININE-BSD FRML MDRD: 58 ML/MIN/1.73SQ M
GLUCOSE P FAST SERPL-MCNC: 96 MG/DL (ref 65–99)
POTASSIUM SERPL-SCNC: 4 MMOL/L (ref 3.5–5.3)
SODIUM SERPL-SCNC: 138 MMOL/L (ref 136–145)

## 2018-12-06 PROCEDURE — 36415 COLL VENOUS BLD VENIPUNCTURE: CPT

## 2018-12-06 PROCEDURE — 80048 BASIC METABOLIC PNL TOTAL CA: CPT

## 2018-12-11 ENCOUNTER — HOSPITAL ENCOUNTER (OUTPATIENT)
Dept: RADIOLOGY | Facility: HOSPITAL | Age: 63
Discharge: HOME | End: 2018-12-11
Payer: COMMERCIAL

## 2018-12-11 DIAGNOSIS — R55 SYNCOPE AND COLLAPSE: ICD-10-CM

## 2018-12-11 DIAGNOSIS — I67.1 CEREBRAL ANEURYSM, NONRUPTURED: ICD-10-CM

## 2018-12-11 PROCEDURE — A9585 GADOBUTROL INJECTION: HCPCS | Performed by: INTERNAL MEDICINE

## 2018-12-11 PROCEDURE — 70553 MRI BRAIN STEM W/O & W/DYE: CPT

## 2018-12-11 RX ADMIN — GADOBUTROL 10 ML: 604.72 INJECTION INTRAVENOUS at 12:52

## 2019-01-14 ENCOUNTER — TRANSCRIBE ORDERS (OUTPATIENT)
Dept: ADMINISTRATIVE | Facility: HOSPITAL | Age: 64
End: 2019-01-14

## 2019-01-14 DIAGNOSIS — Z12.39 SCREENING BREAST EXAMINATION: Primary | ICD-10-CM

## 2019-03-14 ENCOUNTER — HOSPITAL ENCOUNTER (EMERGENCY)
Facility: HOSPITAL | Age: 64
Discharge: HOME | End: 2019-03-14
Attending: EMERGENCY MEDICINE
Payer: COMMERCIAL

## 2019-03-14 VITALS
RESPIRATION RATE: 20 BRPM | TEMPERATURE: 98.6 F | HEART RATE: 57 BPM | SYSTOLIC BLOOD PRESSURE: 192 MMHG | DIASTOLIC BLOOD PRESSURE: 86 MMHG | WEIGHT: 192 LBS | OXYGEN SATURATION: 98 % | HEIGHT: 55 IN | BODY MASS INDEX: 44.43 KG/M2

## 2019-03-14 DIAGNOSIS — M79.89 LEG SWELLING: ICD-10-CM

## 2019-03-14 DIAGNOSIS — M54.9 BACK PAIN: Primary | ICD-10-CM

## 2019-03-14 LAB
ALBUMIN SERPL BCP-MCNC: 3.3 G/DL (ref 3.5–5)
ALP SERPL-CCNC: 80 U/L (ref 46–116)
ALT SERPL W P-5'-P-CCNC: 21 U/L (ref 12–78)
ANION GAP SERPL CALCULATED.3IONS-SCNC: 5 MMOL/L (ref 4–13)
AST SERPL W P-5'-P-CCNC: 39 U/L (ref 5–45)
ATRIAL RATE: 58 BPM
BASOPHILS # BLD AUTO: 0.06 THOUSANDS/ΜL (ref 0–0.1)
BASOPHILS NFR BLD AUTO: 1 % (ref 0–1)
BILIRUB SERPL-MCNC: 0.42 MG/DL (ref 0.2–1)
BUN SERPL-MCNC: 16 MG/DL (ref 5–25)
CALCIUM SERPL-MCNC: 8.7 MG/DL (ref 8.3–10.1)
CHLORIDE SERPL-SCNC: 110 MMOL/L (ref 100–108)
CO2 SERPL-SCNC: 26 MMOL/L (ref 21–32)
CREAT SERPL-MCNC: 0.97 MG/DL (ref 0.6–1.3)
EOSINOPHIL # BLD AUTO: 0.23 THOUSAND/ΜL (ref 0–0.61)
EOSINOPHIL NFR BLD AUTO: 3 % (ref 0–6)
ERYTHROCYTE [DISTWIDTH] IN BLOOD BY AUTOMATED COUNT: 13.2 % (ref 11.6–15.1)
GFR SERPL CREATININE-BSD FRML MDRD: 62 ML/MIN/1.73SQ M
GLUCOSE SERPL-MCNC: 90 MG/DL (ref 65–140)
HCT VFR BLD AUTO: 40.8 % (ref 34.8–46.1)
HGB BLD-MCNC: 13.3 G/DL (ref 11.5–15.4)
IMM GRANULOCYTES # BLD AUTO: 0.03 THOUSAND/UL (ref 0–0.2)
IMM GRANULOCYTES NFR BLD AUTO: 0 % (ref 0–2)
LYMPHOCYTES # BLD AUTO: 1.87 THOUSANDS/ΜL (ref 0.6–4.47)
LYMPHOCYTES NFR BLD AUTO: 25 % (ref 14–44)
MCH RBC QN AUTO: 31.2 PG (ref 26.8–34.3)
MCHC RBC AUTO-ENTMCNC: 32.6 G/DL (ref 31.4–37.4)
MCV RBC AUTO: 96 FL (ref 82–98)
MONOCYTES # BLD AUTO: 0.65 THOUSAND/ΜL (ref 0.17–1.22)
MONOCYTES NFR BLD AUTO: 9 % (ref 4–12)
NEUTROPHILS # BLD AUTO: 4.6 THOUSANDS/ΜL (ref 1.85–7.62)
NEUTS SEG NFR BLD AUTO: 62 % (ref 43–75)
NRBC BLD AUTO-RTO: 0 /100 WBCS
P AXIS: 56 DEGREES
PLATELET # BLD AUTO: 195 THOUSANDS/UL (ref 149–390)
PMV BLD AUTO: 11.7 FL (ref 8.9–12.7)
POTASSIUM SERPL-SCNC: 4.8 MMOL/L (ref 3.5–5.3)
PR INTERVAL: 156 MS
PROT SERPL-MCNC: 7 G/DL (ref 6.4–8.2)
QRS AXIS: -8 DEGREES
QRSD INTERVAL: 86 MS
QT INTERVAL: 418 MS
QTC INTERVAL: 410 MS
RBC # BLD AUTO: 4.26 MILLION/UL (ref 3.81–5.12)
SODIUM SERPL-SCNC: 141 MMOL/L (ref 136–145)
T WAVE AXIS: 44 DEGREES
TROPONIN I SERPL-MCNC: <0.02 NG/ML
VENTRICULAR RATE: 58 BPM
WBC # BLD AUTO: 7.44 THOUSAND/UL (ref 4.31–10.16)

## 2019-03-14 PROCEDURE — 85025 COMPLETE CBC W/AUTO DIFF WBC: CPT | Performed by: EMERGENCY MEDICINE

## 2019-03-14 PROCEDURE — 93010 ELECTROCARDIOGRAM REPORT: CPT | Performed by: INTERNAL MEDICINE

## 2019-03-14 PROCEDURE — 93005 ELECTROCARDIOGRAM TRACING: CPT

## 2019-03-14 PROCEDURE — 84484 ASSAY OF TROPONIN QUANT: CPT | Performed by: EMERGENCY MEDICINE

## 2019-03-14 PROCEDURE — 36415 COLL VENOUS BLD VENIPUNCTURE: CPT | Performed by: EMERGENCY MEDICINE

## 2019-03-14 PROCEDURE — 80053 COMPREHEN METABOLIC PANEL: CPT | Performed by: EMERGENCY MEDICINE

## 2019-03-14 PROCEDURE — 99283 EMERGENCY DEPT VISIT LOW MDM: CPT

## 2019-03-14 NOTE — ED PROVIDER NOTES
History  Chief Complaint   Patient presents with    Leg Swelling     Pt presents with bilateral leg swelling  Pt reports "this has been this way for 2 months "     Back Pain     Pt reports lower back pain  Pt denies any previous injury to her back  This is a 60-year-old female with a history of chronic kidney disease, subarachnoid hemorrhage status post clipping, hypertension who presents with bilateral lower extremity edema and back pain  Over the past 2 months, the patient has been experiencing bilateral lower extremity pain with mild swelling  She is also complaining of back pain described as sharp, constant, lumbar radiating to thoracic, without any associated symptoms  Pain is exacerbated by standing and walking  Denies any alleviating factors  States that she tried elevating her legs without relief of the swelling and pain  States that elevating her legs just makes her back pain worse  Denies fever, saddle anesthesia, bowel/bladder issues, lower extremity numbness/weakness, history of IV drug use, history of cancer, bleeding issues, recent spinal instrumentation  Denies any weight gain  Patient states that she has seen her primary care doctor approximately 1 month ago for her symptoms  Patient does state that her daughter  from breast cancer  No recent breast exam or mammogram per patient  States that she needs to schedule a mammogram   Denies fever/chills, nausea/vomiting, lightheadedness/dizziness, numbness/weakness, headache, change in vision, URI symptoms, neck pain, chest pain, palpitations, shortness of breath, cough, flank pain, abdominal pain, diarrhea, hematochezia, melena, dysuria, hematuria, abnormal vaginal discharge/bleeding  Prior to Admission Medications   Prescriptions Last Dose Informant Patient Reported? Taking?    LORazepam (ATIVAN) 1 mg tablet 3/13/2019 at Unknown time  Yes Yes   Sig: take 1 tablet by mouth daily (TAKE AS NEEDED)   MAPAP 500 MG tablet 3/14/2019 at Unknown time  Yes Yes   Sig: take 2 tablets (1,000 MG) by mouth every 8 hours if needed for pain   atenolol (TENORMIN) 50 mg tablet 3/14/2019 at Unknown time  Yes Yes   omeprazole (PriLOSEC) 20 mg delayed release capsule 3/13/2019 at Unknown time  Yes Yes   Sig: take 1 capsule (20 MG) by mouth once daily A 1/2 AN HOUR BEFORE B   (REFER TO PRESCRIPTION NOTES)  zolpidem (AMBIEN) 10 mg tablet 3/13/2019 at Unknown time  Yes Yes   Sig: take 1 tablet by mouth at bedtime for ( TAKE FOR SLEEP)      Facility-Administered Medications: None       Past Medical History:   Diagnosis Date    Peripheral edema     last assessed 06/23/17       Past Surgical History:   Procedure Laterality Date    BLADDER SURGERY      last assessed 05/09/17    ELBOW SURGERY      last assessed 05/09/17    FOOT SURGERY      last assessed 05/09/17    INTRACRANIAL ANEURYSM REPAIR      last assessed 05/09/17       Family History   Adopted: Yes   Family history unknown: Yes     I have reviewed and agree with the history as documented  Social History     Tobacco Use    Smoking status: Former Smoker    Smokeless tobacco: Former User   Substance Use Topics    Alcohol use: No    Drug use: No        Review of Systems   Constitutional: Negative for chills and fever  HENT: Negative for rhinorrhea, sore throat and trouble swallowing  Eyes: Negative for photophobia and visual disturbance  Respiratory: Negative for cough, chest tightness and shortness of breath  Cardiovascular: Negative for chest pain, palpitations and leg swelling  Gastrointestinal: Negative for abdominal pain, blood in stool, diarrhea, nausea and vomiting  Endocrine: Negative for polyuria  Genitourinary: Negative for dysuria, flank pain, hematuria, vaginal bleeding and vaginal discharge  Musculoskeletal: Positive for arthralgias and back pain  Negative for neck pain  Skin: Negative for color change and rash     Allergic/Immunologic: Negative for immunocompromised state  Neurological: Negative for dizziness, weakness, light-headedness, numbness and headaches  All other systems reviewed and are negative  Physical Exam  ED Triage Vitals   Temperature Pulse Respirations Blood Pressure SpO2   03/14/19 1415 03/14/19 1415 03/14/19 1415 03/14/19 1415 03/14/19 1415   98 6 °F (37 °C) 71 18 (!) 206/86 94 %      Temp Source Heart Rate Source Patient Position - Orthostatic VS BP Location FiO2 (%)   03/14/19 1415 03/14/19 1500 03/14/19 1415 03/14/19 1415 --   Tympanic Monitor Sitting Left arm       Pain Score       03/14/19 1415       8           qSOFA     Row Name 03/14/19 1630 03/14/19 1500 03/14/19 1456 03/14/19 1415       Altered mental status GCS < 15      0       Respiratory Rate > / =22  0  0    0     Systolic BP < / =066  0  0    0     Q Sofa Score  0  0  0  0           Orthostatic Vital Signs  Vitals:    03/14/19 1415 03/14/19 1500 03/14/19 1630   BP: (!) 206/86 (!) 214/89 (!) 192/86   Pulse: 71 58 57   Patient Position - Orthostatic VS: Sitting  Lying       Physical Exam   Constitutional: Vital signs are normal  She appears well-developed  She is cooperative  No distress  HENT:   Mouth/Throat: Uvula is midline, oropharynx is clear and moist and mucous membranes are normal    Eyes: Pupils are equal, round, and reactive to light  Conjunctivae and EOM are normal    Neck: Trachea normal  No thyroid mass and no thyromegaly present  Cardiovascular: Normal rate, regular rhythm, normal heart sounds, intact distal pulses and normal pulses  No murmur heard  Pulmonary/Chest: Effort normal and breath sounds normal  Right breast exhibits no inverted nipple, no mass, no nipple discharge, no skin change and no tenderness  Left breast exhibits no inverted nipple, no mass, no nipple discharge, no skin change and no tenderness  No breast swelling, tenderness, discharge or bleeding  Breasts are symmetrical    Abdominal: Soft   Normal appearance and bowel sounds are normal  There is no tenderness  There is no rebound, no guarding and no CVA tenderness  Musculoskeletal:   Mild swelling noted to bilateral lower extremities  No calf tenderness or erythema  Varicose veins noted throughout bilateral lower extremities  Mildly tender in the right thoracic rib cage area  Neurological: She is alert  She has normal strength and normal reflexes  No sensory deficit  Reflex Scores:       Bicep reflexes are 2+ on the right side and 2+ on the left side  Patient able to toe walk and heel walk  Skin: Skin is warm, dry and intact  Psychiatric: She has a normal mood and affect  Her speech is normal and behavior is normal  Thought content normal        ED Medications  Medications - No data to display    Diagnostic Studies  Results Reviewed     Procedure Component Value Units Date/Time    Comprehensive metabolic panel [[id number]]  (Abnormal) Collected:  03/14/19 1453    Lab Status:  Final result Specimen:  Blood from Arm, Left Updated:  03/14/19 1524     Sodium 141 mmol/L      Potassium 4 8 mmol/L      Chloride 110 mmol/L      CO2 26 mmol/L      ANION GAP 5 mmol/L      BUN 16 mg/dL      Creatinine 0 97 mg/dL      Glucose 90 mg/dL      Calcium 8 7 mg/dL      AST 39 U/L      ALT 21 U/L      Alkaline Phosphatase 80 U/L      Total Protein 7 0 g/dL      Albumin 3 3 g/dL      Total Bilirubin 0 42 mg/dL      eGFR 62 ml/min/1 73sq m     Narrative:       National Kidney Disease Education Program recommendations are as follows:  GFR calculation is accurate only with a steady state creatinine  Chronic Kidney disease less than 60 ml/min/1 73 sq  meters  Kidney failure less than 15 ml/min/1 73 sq  meters      Troponin I [[id number]]  (Normal) Collected:  03/14/19 1453    Lab Status:  Final result Specimen:  Blood from Arm, Left Updated:  03/14/19 1522     Troponin I <0 02 ng/mL     CBC and differential [[id number]] Collected:  03/14/19 1453    Lab Status:  Final result Specimen:  Blood from Arm, Left Updated:  03/14/19 1459     WBC 7 44 Thousand/uL      RBC 4 26 Million/uL      Hemoglobin 13 3 g/dL      Hematocrit 40 8 %      MCV 96 fL      MCH 31 2 pg      MCHC 32 6 g/dL      RDW 13 2 %      MPV 11 7 fL      Platelets 150 Thousands/uL      nRBC 0 /100 WBCs      Neutrophils Relative 62 %      Immat GRANS % 0 %      Lymphocytes Relative 25 %      Monocytes Relative 9 %      Eosinophils Relative 3 %      Basophils Relative 1 %      Neutrophils Absolute 4 60 Thousands/µL      Immature Grans Absolute 0 03 Thousand/uL      Lymphocytes Absolute 1 87 Thousands/µL      Monocytes Absolute 0 65 Thousand/µL      Eosinophils Absolute 0 23 Thousand/µL      Basophils Absolute 0 06 Thousands/µL                  No orders to display         Procedures  ECG 12 Lead Documentation  Date/Time: 3/14/2019 2:59 PM  Performed by: Ben Vasquez MD  Authorized by: Ben Vasquez MD     ECG reviewed by me, the ED Provider: yes    Patient location:  ED  Previous ECG:     Previous ECG:  Compared to current    Similarity:  No change  Interpretation:     Interpretation: abnormal    Rate:     ECG rate:  58    ECG rate assessment: bradycardic    Rhythm:     Rhythm: sinus bradycardia    Ectopy:     Ectopy: none    QRS:     QRS axis:  Normal    QRS intervals:  Normal  Conduction:     Conduction: normal    ST segments:     ST segments:  Normal  T waves:     T waves: normal            Phone Consults  ED Phone Contact    ED Course  ED Course as of Mar 14 1706   Thu Mar 14, 2019   1700 Normal bedside ultrasound of bilateral lower extremities  Images in Q path  MDM  Number of Diagnoses or Management Options  Diagnosis management comments: Will check basic lab work  Reassuring breast exam   Follow up with primary care doctor        Disposition  Final diagnoses:   Back pain   Leg swelling     Time reflects when diagnosis was documented in both MDM as applicable and the Disposition within this note Time User Action Codes Description Comment    3/14/2019  4:59 PM Mary Flair Add [M54 9] Back pain     3/14/2019  4:59 PM [place] Flair Add [M79 89] Leg swelling       ED Disposition     ED Disposition Condition Date/Time Comment    Discharge Stable Thu Mar 14, 2019  4:59 PM Carson Pat discharge to home/self care  Follow-up Information     Follow up With Specialties Details Why Contact Info Additional Valeria 39, CRNP Nurse Practitioner Schedule an appointment as soon as possible for a visit  for follow up for repeat breast exam and mammography follow up  Also, follow up from today's visit  [facility] [facility] Emergency Department Emergency Medicine Go to  If symptoms worsen [address]  [phone]  ED, [address] [place] [address], [phone]          Patient's Medications   Discharge Prescriptions    No medications on file     No discharge procedures on file  ED Provider  Attending physically available and evaluated Carson Pat I managed the patient along with the ED Attending      Electronically Signed by         Spenser Bower MD  03/14/19 2076

## 2019-03-14 NOTE — DISCHARGE INSTRUCTIONS
Back Pain   WHAT YOU NEED TO KNOW:   Back pain is common  It can be caused by many conditions, such as arthritis or the breakdown of spinal discs  Your risk for back pain is increased by injuries, lack of activity, or repeated bending and twisting  You may feel sore or stiff on one or both sides of your back  The pain may spread to your buttocks or thighs  DISCHARGE INSTRUCTIONS:   Medicines:   · NSAIDs  help decrease swelling and pain  This medicine is available with or without a doctor's order  NSAIDs can cause stomach bleeding or kidney problems in certain people  If you take blood thinner medicine, always ask your healthcare provider if NSAIDs are safe for you  Always read the medicine label and follow directions  · Acetaminophen  decreases pain  It is available without a doctor's order  Ask how much to take and how often to take it  Follow directions  Acetaminophen can cause liver damage if not taken correctly  · Prescription pain medicine  may be given  Ask your healthcare provider how to take this medicine safely  · Take your medicine as directed  Contact your healthcare provider if you think your medicine is not helping or if you have side effects  Tell him or her if you are allergic to any medicine  Keep a list of the medicines, vitamins, and herbs you take  Include the amounts, and when and why you take them  Bring the list or the pill bottles to follow-up visits  Carry your medicine list with you in case of an emergency  Follow up with your healthcare provider in 2 weeks, or as directed:  Write down your questions so you remember to ask them during your visits  How to manage your back pain:   · Apply ice  on your back or affected area for 15 to 20 minutes every hour or as directed  Use an ice pack, or put crushed ice in a plastic bag  Cover it with a towel  Ice helps prevent tissue damage and decreases pain      · Apply heat  on your back or affected area for 20 to 30 minutes every 2 hours for as many days as directed  Heat helps decrease pain and muscle spasms  · Stay active  as much as you can without causing more pain  Bed rest could make your back pain worse  Avoid heavy lifting until your pain is gone  Return to the emergency department if:   · You have pain, numbness, or weakness in one or both legs  · Your pain becomes so severe that you cannot walk  · You cannot control your urine or bowel movements  · You have severe back pain with chest pain  · You have severe back pain, nausea, and vomiting  · You have severe back pain that spreads to your side or genital area  Contact your healthcare provider if:   · You have back pain that does not get better with rest and pain medicine  · You have a fever  · You have pain that worsens when you are on your back or when you rest     · You have pain that worsens when you cough or sneeze  · You lose weight without trying  · You have questions or concerns about your condition or care  © 2017 [address] [facility] Information is for End User's use only and may not be sold, redistributed or otherwise used for commercial purposes  All illustrations and images included in CareNotes® are the copyrighted property of [employer] , [employer]  or Yuan Soto  The above information is an  only  It is not intended as medical advice for individual conditions or treatments  Talk to your doctor, nurse or pharmacist before following any medical regimen to see if it is safe and effective for you

## 2019-03-14 NOTE — ED ATTENDING ATTESTATION
Jose GROVE Like DO Lucia, saw and evaluated the patient  I have discussed the patient with the resident/non-physician practitioner and agree with the resident's/non-physician practitioner's findings, Plan of Care, and MDM as documented in the resident's/non-physician practitioner's note, except where noted  All available labs and Radiology studies were reviewed  I was present for key portions of any procedure(s) performed by the resident/non-physician practitioner and I was immediately available to provide assistance  At this point I agree with the current assessment done in the Emergency Department  I have conducted an independent evaluation of this patient a history and physical is as follows:      Critical Care Time  Procedures     59 yr old fem with chronic back pain for several months  Pain mostly on the right side and incr with motion and use  No sensory changes, weakness, sob, chest pain, fever, nv, breast complaints, injury, overuse  Taking tylenol and motrin wo benefit  Being seen by her primary care doc  Told to take stretching class like Yoga  She did not because it is against her [employer]  Also with BENNETT swelling for two months  ? If related to NSAIDs  ? Her taking because of CKD hx  Exm Sits up wo restriction  Lungs: cta  No CVAT  Abd: soft and nontender  No masses or bruits  Peripheral pulses equal bilaterally  Back: tender along upper aspect of erector spinae  Legs: no significant swelling and no tenderness    Pln: lab screen with cont fu with primary

## 2019-04-16 ENCOUNTER — TRANSCRIBE ORDERS (OUTPATIENT)
Dept: ADMINISTRATIVE | Facility: HOSPITAL | Age: 64
End: 2019-04-16

## 2019-04-19 ENCOUNTER — TRANSCRIBE ORDERS (OUTPATIENT)
Dept: ADMINISTRATIVE | Facility: HOSPITAL | Age: 64
End: 2019-04-19

## 2019-04-19 DIAGNOSIS — M48.00 SPINAL STENOSIS, UNSPECIFIED SPINAL REGION: Primary | ICD-10-CM

## 2019-05-06 ENCOUNTER — HOSPITAL ENCOUNTER (OUTPATIENT)
Dept: RADIOLOGY | Facility: HOSPITAL | Age: 64
Discharge: HOME | End: 2019-05-06
Payer: COMMERCIAL

## 2019-05-06 DIAGNOSIS — M48.00 SPINAL STENOSIS, UNSPECIFIED SPINAL REGION: ICD-10-CM

## 2019-05-06 PROCEDURE — 72148 MRI LUMBAR SPINE W/O DYE: CPT

## 2019-05-20 ENCOUNTER — OFFICE VISIT (OUTPATIENT)
Dept: OBGYN CLINIC | Facility: HOSPITAL | Age: 64
End: 2019-05-20
Payer: COMMERCIAL

## 2019-05-20 VITALS
HEART RATE: 54 BPM | WEIGHT: 196 LBS | SYSTOLIC BLOOD PRESSURE: 141 MMHG | HEIGHT: 60 IN | DIASTOLIC BLOOD PRESSURE: 85 MMHG | BODY MASS INDEX: 38.48 KG/M2

## 2019-05-20 DIAGNOSIS — G89.4 CHRONIC PAIN SYNDROME: ICD-10-CM

## 2019-05-20 DIAGNOSIS — M51.36 DDD (DEGENERATIVE DISC DISEASE), LUMBAR: Primary | ICD-10-CM

## 2019-05-20 PROBLEM — M51.369 DDD (DEGENERATIVE DISC DISEASE), LUMBAR: Status: ACTIVE | Noted: 2019-05-20

## 2019-05-20 PROCEDURE — 99204 OFFICE O/P NEW MOD 45 MIN: CPT | Performed by: ORTHOPAEDIC SURGERY

## 2019-05-22 ENCOUNTER — TELEPHONE (OUTPATIENT)
Dept: RADIOLOGY | Facility: CLINIC | Age: 64
End: 2019-05-22

## 2019-06-03 ENCOUNTER — HOSPITAL ENCOUNTER (OUTPATIENT)
Dept: RADIOLOGY | Facility: CLINIC | Age: 64
Discharge: HOME | End: 2019-06-03
Payer: COMMERCIAL

## 2019-06-03 VITALS
RESPIRATION RATE: 20 BRPM | OXYGEN SATURATION: 98 % | SYSTOLIC BLOOD PRESSURE: 145 MMHG | TEMPERATURE: 98.2 F | DIASTOLIC BLOOD PRESSURE: 91 MMHG | HEART RATE: 55 BPM

## 2019-06-03 DIAGNOSIS — M54.16 LUMBAR RADICULOPATHY: ICD-10-CM

## 2019-06-03 PROCEDURE — 62323 NJX INTERLAMINAR LMBR/SAC: CPT | Performed by: ANESTHESIOLOGY

## 2019-06-03 RX ORDER — METHYLPREDNISOLONE ACETATE 80 MG/ML
80 INJECTION, SUSPENSION INTRA-ARTICULAR; INTRALESIONAL; INTRAMUSCULAR; PARENTERAL; SOFT TISSUE ONCE
Status: COMPLETED | OUTPATIENT
Start: 2019-06-03 | End: 2019-06-03

## 2019-06-03 RX ORDER — LIDOCAINE HYDROCHLORIDE 10 MG/ML
5 INJECTION, SOLUTION EPIDURAL; INFILTRATION; INTRACAUDAL; PERINEURAL ONCE
Status: COMPLETED | OUTPATIENT
Start: 2019-06-03 | End: 2019-06-03

## 2019-06-03 RX ADMIN — LIDOCAINE HYDROCHLORIDE 3 ML: 10 INJECTION, SOLUTION EPIDURAL; INFILTRATION; INTRACAUDAL; PERINEURAL at 14:23

## 2019-06-03 RX ADMIN — METHYLPREDNISOLONE ACETATE 80 MG: 80 INJECTION, SUSPENSION INTRA-ARTICULAR; INTRALESIONAL; INTRAMUSCULAR; SOFT TISSUE at 14:25

## 2019-06-03 RX ADMIN — IOHEXOL 1 ML: 300 INJECTION, SOLUTION INTRAVENOUS at 14:24

## 2019-06-10 ENCOUNTER — TELEPHONE (OUTPATIENT)
Dept: PAIN MEDICINE | Facility: CLINIC | Age: 64
End: 2019-06-10

## 2019-06-12 ENCOUNTER — TRANSCRIBE ORDERS (OUTPATIENT)
Dept: ADMINISTRATIVE | Facility: HOSPITAL | Age: 64
End: 2019-06-12

## 2019-06-17 ENCOUNTER — TRANSCRIBE ORDERS (OUTPATIENT)
Dept: INTERNAL MEDICINE CLINIC | Facility: CLINIC | Age: 64
End: 2019-06-17

## 2019-06-17 DIAGNOSIS — S82.891S FRACTURE OF RIGHT ANKLE, SEQUELA: Primary | ICD-10-CM

## 2019-06-19 ENCOUNTER — TRANSCRIBE ORDERS (OUTPATIENT)
Dept: ADMINISTRATIVE | Facility: HOSPITAL | Age: 64
End: 2019-06-19

## 2019-06-19 DIAGNOSIS — I83.90 ASYMPTOMATIC VARICOSE VEINS: Primary | ICD-10-CM

## 2019-06-24 ENCOUNTER — CONSULT (OUTPATIENT)
Dept: MULTI SPECIALTY CLINIC | Facility: CLINIC | Age: 64
End: 2019-06-24

## 2019-06-24 VITALS
HEIGHT: 60 IN | TEMPERATURE: 98.4 F | SYSTOLIC BLOOD PRESSURE: 158 MMHG | WEIGHT: 194 LBS | HEART RATE: 57 BPM | DIASTOLIC BLOOD PRESSURE: 92 MMHG | BODY MASS INDEX: 38.09 KG/M2

## 2019-06-24 DIAGNOSIS — S90.212A HEMATOMA, SUBUNGUAL, GREAT TOE, LEFT, INITIAL ENCOUNTER: Primary | ICD-10-CM

## 2019-06-24 DIAGNOSIS — S82.891S FRACTURE OF RIGHT ANKLE, SEQUELA: ICD-10-CM

## 2019-06-24 DIAGNOSIS — I83.813 VARICOSE VEINS OF BOTH LOWER EXTREMITIES WITH PAIN: ICD-10-CM

## 2019-06-24 PROCEDURE — 99213 OFFICE O/P EST LOW 20 MIN: CPT | Performed by: PODIATRIST

## 2019-07-08 ENCOUNTER — CLINICAL SUPPORT (OUTPATIENT)
Dept: PAIN MEDICINE | Facility: CLINIC | Age: 64
End: 2019-07-08
Payer: COMMERCIAL

## 2019-07-08 VITALS
TEMPERATURE: 98.3 F | SYSTOLIC BLOOD PRESSURE: 175 MMHG | DIASTOLIC BLOOD PRESSURE: 92 MMHG | BODY MASS INDEX: 37.89 KG/M2 | HEART RATE: 68 BPM | HEIGHT: 60 IN | WEIGHT: 193 LBS

## 2019-07-08 DIAGNOSIS — M79.18 MYOFASCIAL PAIN: ICD-10-CM

## 2019-07-08 DIAGNOSIS — M51.36 DDD (DEGENERATIVE DISC DISEASE), LUMBAR: ICD-10-CM

## 2019-07-08 DIAGNOSIS — M54.16 LUMBAR RADICULOPATHY: Primary | ICD-10-CM

## 2019-07-08 PROCEDURE — 99214 OFFICE O/P EST MOD 30 MIN: CPT | Performed by: ANESTHESIOLOGY

## 2019-07-08 RX ORDER — BUPROPION HYDROCHLORIDE 150 MG/1
TABLET, EXTENDED RELEASE ORAL
Refills: 0 | COMMUNITY
Start: 2019-06-28

## 2019-07-08 RX ORDER — LOSARTAN POTASSIUM 25 MG/1
TABLET ORAL
Refills: 0 | COMMUNITY
Start: 2019-07-02

## 2019-07-08 RX ORDER — ACETAMINOPHEN/DIPHENHYDRAMINE 500MG-25MG
TABLET ORAL
Refills: 0 | COMMUNITY
Start: 2019-07-04

## 2019-07-08 RX ORDER — RISPERIDONE 1 MG/1
TABLET, FILM COATED ORAL
Refills: 0 | COMMUNITY
Start: 2019-06-25

## 2019-07-08 NOTE — PROGRESS NOTES
Assessment  1  Lumbar radiculopathy    2  DDD (degenerative disc disease), lumbar    3  Myofascial pain        Plan  22-year-old female presenting for initial consultation regarding a 7 month history of lumbosacral back pain with radiculopathy in bilateral lower extremities in no specific dermatomal fashion  MRI of the lumbar spine reveals degenerative disc disease from L3-4-L5-S1 with some foraminal stenosis at L4-5  The patient's low back pain seems to be multifactorial including myofascial components and potentially some facet mediated components  The patient's radicular symptoms are likely stemming from foraminal stenosis at L4-5  The patient did have an L4-5 LESI Guera 3, 2019 with about 75% improvement of her low back pain and almost complete resolution of her lower extremity symptoms which she is still experiencing today  She does take Tylenol and Aleve p r n  With mild to moderate relief  She does go to the gym regularly to exercise  She was evaluated by Dr Phong George who did not feel that surgical intervention was required  1  We will repeat L4-5 LESI p r n  Will hold off on interventional therapy at this time considering she is still having ongoing relief from 1st LESI  2  The patient may continue with Aleve and Tylenol p r n  And I have advised her to clear her NSAID use with PCP considering her history of CKD although her creatinine is within normal limits per last blood draw  3  The patient will continue with her home exercise program  4  I will follow up the patient in 3 months or sooner if needed    My impressions and treatment recommendations were discussed in detail with the patient who verbalized understanding and had no further questions  Discharge instructions were provided  I personally saw and examined the patient and I agree with the above discussed plan of care  No orders of the defined types were placed in this encounter      No orders of the defined types were placed in this encounter  History of Present Illness    Mary Malagon is a 59 y o  female presenting for initial consultation for 7 month history of lumbosacral back pain that radiates into bilateral lower extremities in no specific dermatomal fashion with occasional subjective weakness  She denies any trauma or inciting event  She denies any numbness or paresthesias in the legs  She denies any bladder or bowel incontinence or saddle anesthesia  MRI of the lumbar spine reveals degenerative disc disease from L3-4-L5-S1 with some foraminal stenosis at L4-5  The patient's low back pain seems to be multifactorial including myofascial components and potentially some facet mediated components  The patient's radicular symptoms are likely stemming from foraminal stenosis at L4-5  The patient did have an L4-5 LESI Guera 3, 2019 with about 75% improvement of her low back pain and almost complete resolution of her lower extremity symptoms which she is still experiencing today  She does take Tylenol and Aleve p r n  With mild to moderate relief  She does go to the gym regularly to exercise  She was evaluated by Dr Henry Michel who did not feel that surgical intervention was required  The patient rates her pain a 9/10 on the pain is nearly constant  The pain is described sharp  The pain is worse in the afternoon and evening  The pain is decreased with prior and increased with standing and walking  I have personally reviewed and/or updated the patient's past medical history, past surgical history, family history, social history, current medications, allergies, and vital signs today  Other than as stated above, the patient denies any interval changes in medications, medical condition, mental condition, symptoms, or allergies since the last office visit  Review of Systems   Constitutional: Positive for unexpected weight change  Negative for fever  HENT: Positive for hearing loss  Negative for trouble swallowing  Eyes: Negative for visual disturbance  Respiratory: Negative for shortness of breath and wheezing  Cardiovascular: Positive for palpitations and leg swelling  Negative for chest pain  Gastrointestinal: Negative for constipation, diarrhea, nausea and vomiting  Endocrine: Negative for cold intolerance, heat intolerance and polydipsia  Genitourinary: Positive for difficulty urinating  Negative for frequency  Musculoskeletal: Positive for joint swelling and myalgias  Negative for arthralgias and gait problem  Skin: Negative for rash  Neurological: Negative for dizziness, seizures, syncope, weakness and headaches  Hematological: Does not bruise/bleed easily  Psychiatric/Behavioral: Negative for dysphoric mood  Anxiety, depression   All other systems reviewed and are negative        Patient Active Problem List   Diagnosis    Vitamin D deficiency    Varicose veins of both lower extremities with pain    Sural neuritis, right    Subarachnoid hemorrhage (HCC)    Posterior communicating artery aneurysm    Ischemic changes on head CT    Depression with anxiety    Chronic pain in right foot    Benign essential hypertension    Colon cancer screening    CKD stage G3a/A1, GFR 45-59 and albumin creatinine ratio <30 mg/g ([facility] )    Visit for screening mammogram    DDD (degenerative disc disease), lumbar    Chronic pain syndrome    Lumbar radiculopathy       Past Medical History:   Diagnosis Date    Peripheral edema     last assessed 06/23/17       Past Surgical History:   Procedure Laterality Date    BLADDER SURGERY      last assessed 05/09/17    ELBOW SURGERY      last assessed 05/09/17    FOOT SURGERY      last assessed 05/09/17    INTRACRANIAL ANEURYSM REPAIR      last assessed 05/09/17       Family History   Adopted: Yes   Family history unknown: Yes       Social History     Occupational History    Not on file   Tobacco Use    Smoking status: Former Smoker    Smokeless tobacco: Former User   Substance and Sexual Activity    Alcohol use: No    Drug use: No    Sexual activity: Not Currently       Current Outpatient Medications on File Prior to Visit   Medication Sig    atenolol (TENORMIN) 50 mg tablet     LORazepam (ATIVAN) 1 mg tablet take 1 tablet by mouth daily (TAKE AS NEEDED)    MAPAP 500 MG tablet take 2 tablets (1,000 MG) by mouth every 8 hours if needed for pain    omeprazole (PriLOSEC) 20 mg delayed release capsule take 1 capsule (20 MG) by mouth once daily A 1/2 AN HOUR BEFORE B   (REFER TO PRESCRIPTION NOTES)   zolpidem (AMBIEN) 10 mg tablet take 1 tablet by mouth at bedtime for ( TAKE FOR SLEEP)     No current facility-administered medications on file prior to visit  Allergies   Allergen Reactions    Oxycodone-Acetaminophen Hives and Angioedema     Category: Allergy;     Ace Inhibitors Hives     Category: Allergy;     Lisinopril Other (See Comments)     FATIGUE       Physical Exam    Ht 5' (1 524 m)   BMI 37 89 kg/m²     Constitutional: normal, well developed, well nourished, alert, in no distress and non-toxic and no overt pain behavior  Eyes: anicteric  HEENT: grossly intact  Neck: supple, symmetric, trachea midline and no masses   Pulmonary:even and unlabored  Cardiovascular:No edema or pitting edema present  Skin:Normal without rashes or lesions and well hydrated  Psychiatric:Mood and affect appropriate  Neurologic:Cranial Nerves II-XII grossly intact  Musculoskeletal:normal gait  Bilateral lumbar paraspinals nontender to palpation, but ropy in texture from L2-L5  Bilateral SI joints nontender to palpation  Bilateral patellar and Achilles reflexes were 2/4 and symmetrical   No clonus was noted bilaterally  Bilateral lower extremity strength 5/5 in all muscle groups  Sensation intact to light touch in L3 thru S1 dermatomes bilaterally  Negative straight leg raise bilaterally  Negative Bari's test bilaterally      Imaging      PACS Images  Show images for MRI lumbar spine wo contrast   Study Result     MRI LUMBAR SPINE WITHOUT CONTRAST     INDICATION: M48 00: Spinal stenosis, site unspecified  Spinal stenosis, left-sided low back pain for years      COMPARISON:  Radiographs of lumbar spine from October 12, 2018      TECHNIQUE:  Sagittal T1, sagittal T2, sagittal inversion recovery, axial T1 and axial T2, coronal T2        IMAGE QUALITY:  Diagnostic     FINDINGS:     VERTEBRAL BODIES:  Normal alignment of the lumbar spine  No spondylolysis or spondylolisthesis  No scoliosis  No compression fracture  Normal marrow signal is identified within the visualized bony structures  Subcentimeter hemangioma within the   left L1 vertebral body        SACRUM:  Normal signal within the sacrum  No evidence of insufficiency or stress fracture      DISTAL CORD AND CONUS:  Normal size and signal within the distal cord and conus        PARASPINAL SOFT TISSUES:  Paraspinal soft tissues are unremarkable      LOWER THORACIC DISC SPACES:  Normal disc height and signal   No disc herniation, canal stenosis or foraminal narrowing      LUMBAR DISC SPACES:  Mild diffuse disc desiccation with mild disc height loss at L3-L4      L1-L2:  Normal      L2-L3:  Normal      L3-L4:  Minimal bulging of the disc annulus  No spinal canal or foraminal stenosis      L4-L5:  Mild disc bulge shallow left foraminal protrusion, mild bilateral foraminal stenosis  No significant spinal stenosis      L5-S1:  Left central annular fissure which shallow broad left central protrusion without significant spinal canal or foraminal stenosis      IMPRESSION:     Minor degenerative discogenic disease of the lumbar spine  No significant spinal canal stenosis    Small left foraminal protrusions at L4-L5 and L5-S1; correlate for ipsilateral radiculopathy         Workstation performed: [id number]           PACS Images      Show images for XR spine lumbar complete w bending minimum 6 views   Study Result     LUMBAR SPINE     INDICATION:   M54 9: Dorsalgia, unspecified      COMPARISON:  None     VIEWS:  XR SPINE LUMBAR COMPLETE W BENDING MINIMUM 6 VIEWS  7 images     FINDINGS:  Mild diffuse osteopenia     Mild degenerative spondylosis L3-4, L4-5 and L5-S1      There is no evidence of acute fracture or destructive osseous lesion      Alignment is unremarkable      The pedicles appear intact      Soft tissues are unremarkable      IMPRESSION:  Mild diffuse osteopenia    Multilevel degenerative spondylosis      No evidence of subluxation or dynamic instability        Workstation performed: [id number]

## 2019-07-11 ENCOUNTER — HOSPITAL ENCOUNTER (OUTPATIENT)
Dept: NON INVASIVE DIAGNOSTICS | Facility: HOSPITAL | Age: 64
Discharge: HOME | End: 2019-07-11
Payer: COMMERCIAL

## 2019-07-11 DIAGNOSIS — I83.90 ASYMPTOMATIC VARICOSE VEINS: ICD-10-CM

## 2019-07-11 PROCEDURE — 93970 EXTREMITY STUDY: CPT

## 2019-07-12 PROCEDURE — 93970 EXTREMITY STUDY: CPT | Performed by: SURGERY

## 2019-08-28 ENCOUNTER — OFFICE VISIT (OUTPATIENT)
Dept: PODIATRY | Facility: CLINIC | Age: 64
End: 2019-08-28
Payer: COMMERCIAL

## 2019-08-28 VITALS
HEIGHT: 60 IN | BODY MASS INDEX: 38.24 KG/M2 | DIASTOLIC BLOOD PRESSURE: 90 MMHG | HEART RATE: 68 BPM | WEIGHT: 194.8 LBS | SYSTOLIC BLOOD PRESSURE: 129 MMHG

## 2019-08-28 DIAGNOSIS — M19.071 ARTHRITIS OF RIGHT ANKLE: Primary | ICD-10-CM

## 2019-08-28 DIAGNOSIS — G62.9 PERIPHERAL NERVE DISORDER: ICD-10-CM

## 2019-08-28 PROCEDURE — 99214 OFFICE O/P EST MOD 30 MIN: CPT | Performed by: PODIATRIST

## 2019-08-28 RX ORDER — PRAVASTATIN SODIUM 10 MG
TABLET ORAL
Refills: 0 | COMMUNITY
Start: 2019-08-14

## 2019-08-28 RX ORDER — SERTRALINE HYDROCHLORIDE 100 MG/1
TABLET, FILM COATED ORAL
Refills: 0 | COMMUNITY
Start: 2019-08-19

## 2019-08-28 RX ORDER — GABAPENTIN 300 MG/1
300 CAPSULE ORAL
Qty: 30 CAPSULE | Refills: 0 | Status: SHIPPED | OUTPATIENT
Start: 2019-08-28 | End: 2019-11-06

## 2019-08-28 NOTE — PROGRESS NOTES
Assessment/Plan:    X-rays, three views right ankle or positive for osteoarthritis  Explained to patient that she is dealing with both arthritis of the right ankle and peripheral neuropathy at the site of the incision  Her pain is not so severe that a surgical consult as needed  Oral anti-inflammatories are not advised for this patient  She was placed on gabapentin 300 mg HS  She will be rescheduled in 1 month  No problem-specific Assessment & Plan notes found for this encounter  Diagnoses and all orders for this visit:    Arthritis of right ankle  -     X-ray ankle right 3+ views; Future    Peripheral nerve disorder    Other orders  -     pravastatin (PRAVACHOL) 10 mg tablet  -     sertraline (ZOLOFT) 100 mg tablet          Subjective:      Patient ID: Andre Pruett is a 59 y o  female  HPI     Patient, a 28-year-old female presents with right ankle pain  Patient states that she has been in pain since having right ankle surgery approximately 2 years ago at [facility] and work  She states that a tendon transfer was performed  She states that she fell multiple times after the surgery and has been uncomfortable ever since  She feel stiffness in the ankle as well as a numbness and tingling in the area of the surgical site  On questioning, she does not know why she had the ankle surgery performed nor does she know what tendon was transferred  Patient has a history of renal issues  The following portions of the patient's history were reviewed and updated as appropriate: allergies, current medications, past family history, past medical history, past social history, past surgical history and problem list     Review of Systems   Cardiovascular:        History of aneurysm   Gastrointestinal:        Oral anti-inflammatories not advised due to renal issues   Genitourinary:        History of renal disease   Musculoskeletal: Positive for gait problem and joint swelling     Neurological: Positive for numbness  Objective:      /90   Pulse 68   Ht 5' (1 524 m) Comment: verbal  Wt 88 4 kg (194 lb 12 8 oz)   BMI 38 04 kg/m²          Physical Exam   Constitutional: She is oriented to person, place, and time  She appears well-developed and well-nourished  Cardiovascular: Regular rhythm and intact distal pulses  Temperature and turgor within normal limits bilateral   Musculoskeletal: She exhibits edema and tenderness  Range of motion right ankle restricted in dorsiflexion and plantar flexion as well as frontal plane motion   Neurological: She is alert and oriented to person, place, and time  No sensory deficit  Paresthesia with light percussion overlying scar at lateral malleolus right ankle   Skin: Skin is warm  No rash noted  No erythema  Well-healed scar overlying lateral ankle ligaments right foot

## 2019-09-25 ENCOUNTER — OFFICE VISIT (OUTPATIENT)
Dept: PODIATRY | Facility: CLINIC | Age: 64
End: 2019-09-25
Payer: COMMERCIAL

## 2019-09-25 VITALS
BODY MASS INDEX: 38.68 KG/M2 | SYSTOLIC BLOOD PRESSURE: 146 MMHG | HEART RATE: 55 BPM | WEIGHT: 197 LBS | DIASTOLIC BLOOD PRESSURE: 93 MMHG | HEIGHT: 60 IN

## 2019-09-25 DIAGNOSIS — G62.9 PERIPHERAL NERVE DISORDER: ICD-10-CM

## 2019-09-25 DIAGNOSIS — M19.071 ARTHRITIS OF RIGHT ANKLE: Primary | ICD-10-CM

## 2019-09-25 PROCEDURE — 99213 OFFICE O/P EST LOW 20 MIN: CPT | Performed by: PODIATRIST

## 2019-09-25 NOTE — PROGRESS NOTES
Assessment/Plan:    Explained to patient that the medication that I would like to place her on is a nonsteroidal anti-inflammatory  This cannot be done due to her renal history  Patient has no understanding that she has a kidney problem  She is advised to return to her medical doctor to see about the renal disorder and to see if she can tolerate an oral anti-inflammatory  No problem-specific Assessment & Plan notes found for this encounter  There are no diagnoses linked to this encounter  Subjective:      Patient ID: Aura Primrose is a 59 y o  female  HPI     Patient presents for assessment of her ankles and her response to gabapentin  Patient did not find the medication helpful and discontinued it  She now also relates pain in the left ankle  Patient has known history of a back disorder and she has been seen by pain management in the past   In reviewing her chart, it is noticed that she has been still considered a stage III renal patient  Patient brought information regarding her surgery of her right ankle that was performed in March of 2016 by Dr Celeste Buitrago  She had a tendon repair and and a sulcus deepening  The following portions of the patient's history were reviewed and updated as appropriate: allergies, current medications, past family history, past medical history, past social history, past surgical history and problem list     Review of Systems   Genitourinary:        Chart list her as a stage III renal patient  Musculoskeletal: Positive for arthralgias and back pain  Neurological:        Chronic pain patient             Objective:      /93   Pulse 55   Ht 5' (1 524 m)   Wt 89 4 kg (197 lb)   BMI 38 47 kg/m²          Physical Exam   Constitutional: She appears well-developed and well-nourished  Cardiovascular: Regular rhythm and intact distal pulses  Musculoskeletal: She exhibits edema and tenderness     Mild ankle edema bilateral   Minimal pain with range of motion  Neurological: No sensory deficit  She exhibits normal muscle tone

## 2019-10-21 ENCOUNTER — TRANSCRIBE ORDERS (OUTPATIENT)
Dept: ADMINISTRATIVE | Facility: HOSPITAL | Age: 64
End: 2019-10-21

## 2019-10-21 DIAGNOSIS — R10.9 ABDOMINAL PAIN, UNSPECIFIED ABDOMINAL LOCATION: Primary | ICD-10-CM

## 2019-10-24 ENCOUNTER — HOSPITAL ENCOUNTER (OUTPATIENT)
Dept: RADIOLOGY | Facility: HOSPITAL | Age: 64
Discharge: HOME | End: 2019-10-24
Payer: COMMERCIAL

## 2019-10-24 DIAGNOSIS — R10.9 ABDOMINAL PAIN, UNSPECIFIED ABDOMINAL LOCATION: ICD-10-CM

## 2019-10-24 PROCEDURE — 76705 ECHO EXAM OF ABDOMEN: CPT

## 2019-12-13 ENCOUNTER — LAB (OUTPATIENT)
Dept: LAB | Facility: HOSPITAL | Age: 64
End: 2019-12-13
Payer: COMMERCIAL

## 2019-12-13 ENCOUNTER — TRANSCRIBE ORDERS (OUTPATIENT)
Dept: LAB | Facility: HOSPITAL | Age: 64
End: 2019-12-13

## 2019-12-13 DIAGNOSIS — Z13.1 SCREENING FOR DIABETES MELLITUS: ICD-10-CM

## 2019-12-13 DIAGNOSIS — Z79.899 ENCOUNTER FOR LONG-TERM (CURRENT) USE OF OTHER MEDICATIONS: ICD-10-CM

## 2019-12-13 DIAGNOSIS — R10.9 ABDOMINAL PAIN, UNSPECIFIED ABDOMINAL LOCATION: ICD-10-CM

## 2019-12-13 DIAGNOSIS — E88.89 MADELUNG'S NECK (HCC): ICD-10-CM

## 2019-12-13 DIAGNOSIS — I10 HYPERTENSION, UNSPECIFIED TYPE: ICD-10-CM

## 2019-12-13 DIAGNOSIS — I10 ESSENTIAL HYPERTENSION, MALIGNANT: ICD-10-CM

## 2019-12-13 DIAGNOSIS — H61.20 IMPACTED CERUMEN, UNSPECIFIED LATERALITY: ICD-10-CM

## 2019-12-13 DIAGNOSIS — F32.A DEPRESSIVE TYPE PSYCHOSIS: ICD-10-CM

## 2019-12-13 DIAGNOSIS — F32.A DEPRESSIVE TYPE PSYCHOSIS: Primary | ICD-10-CM

## 2019-12-13 LAB
25(OH)D3 SERPL-MCNC: 21 NG/ML (ref 30–100)
ALBUMIN SERPL BCP-MCNC: 3.6 G/DL (ref 3.5–5)
ALP SERPL-CCNC: 80 U/L (ref 46–116)
ALT SERPL W P-5'-P-CCNC: 25 U/L (ref 12–78)
ANION GAP SERPL CALCULATED.3IONS-SCNC: 3 MMOL/L (ref 4–13)
AST SERPL W P-5'-P-CCNC: 11 U/L (ref 5–45)
BACTERIA UR QL AUTO: ABNORMAL /HPF
BASOPHILS # BLD AUTO: 0.06 THOUSANDS/ΜL (ref 0–0.1)
BASOPHILS NFR BLD AUTO: 1 % (ref 0–1)
BILIRUB SERPL-MCNC: 0.33 MG/DL (ref 0.2–1)
BILIRUB UR QL STRIP: NEGATIVE
BUN SERPL-MCNC: 26 MG/DL (ref 5–25)
CALCIUM SERPL-MCNC: 9 MG/DL (ref 8.3–10.1)
CHLORIDE SERPL-SCNC: 108 MMOL/L (ref 100–108)
CHOLEST SERPL-MCNC: 210 MG/DL (ref 50–200)
CLARITY UR: CLEAR
CO2 SERPL-SCNC: 28 MMOL/L (ref 21–32)
COLOR UR: YELLOW
CREAT SERPL-MCNC: 1.07 MG/DL (ref 0.6–1.3)
EOSINOPHIL # BLD AUTO: 0.19 THOUSAND/ΜL (ref 0–0.61)
EOSINOPHIL NFR BLD AUTO: 3 % (ref 0–6)
ERYTHROCYTE [DISTWIDTH] IN BLOOD BY AUTOMATED COUNT: 12.9 % (ref 11.6–15.1)
EST. AVERAGE GLUCOSE BLD GHB EST-MCNC: 117 MG/DL
GFR SERPL CREATININE-BSD FRML MDRD: 55 ML/MIN/1.73SQ M
GLUCOSE P FAST SERPL-MCNC: 87 MG/DL (ref 65–99)
GLUCOSE UR STRIP-MCNC: NEGATIVE MG/DL
HBA1C MFR BLD: 5.7 % (ref 4.2–6.3)
HCT VFR BLD AUTO: 43.9 % (ref 34.8–46.1)
HDLC SERPL-MCNC: 47 MG/DL
HGB BLD-MCNC: 14 G/DL (ref 11.5–15.4)
HGB UR QL STRIP.AUTO: NEGATIVE
HYALINE CASTS #/AREA URNS LPF: ABNORMAL /LPF
IMM GRANULOCYTES # BLD AUTO: 0.03 THOUSAND/UL (ref 0–0.2)
IMM GRANULOCYTES NFR BLD AUTO: 1 % (ref 0–2)
KETONES UR STRIP-MCNC: NEGATIVE MG/DL
LDLC SERPL CALC-MCNC: 132 MG/DL (ref 0–100)
LEUKOCYTE ESTERASE UR QL STRIP: ABNORMAL
LYMPHOCYTES # BLD AUTO: 1.37 THOUSANDS/ΜL (ref 0.6–4.47)
LYMPHOCYTES NFR BLD AUTO: 21 % (ref 14–44)
MCH RBC QN AUTO: 31 PG (ref 26.8–34.3)
MCHC RBC AUTO-ENTMCNC: 31.9 G/DL (ref 31.4–37.4)
MCV RBC AUTO: 97 FL (ref 82–98)
MONOCYTES # BLD AUTO: 0.54 THOUSAND/ΜL (ref 0.17–1.22)
MONOCYTES NFR BLD AUTO: 8 % (ref 4–12)
NEUTROPHILS # BLD AUTO: 4.26 THOUSANDS/ΜL (ref 1.85–7.62)
NEUTS SEG NFR BLD AUTO: 66 % (ref 43–75)
NITRITE UR QL STRIP: NEGATIVE
NON-SQ EPI CELLS URNS QL MICRO: ABNORMAL /HPF
NONHDLC SERPL-MCNC: 163 MG/DL
NRBC BLD AUTO-RTO: 0 /100 WBCS
PH UR STRIP.AUTO: 5.5 [PH]
PLATELET # BLD AUTO: 203 THOUSANDS/UL (ref 149–390)
PMV BLD AUTO: 11.4 FL (ref 8.9–12.7)
POTASSIUM SERPL-SCNC: 4 MMOL/L (ref 3.5–5.3)
PROLACTIN SERPL-MCNC: 12.5 NG/ML
PROT SERPL-MCNC: 7.2 G/DL (ref 6.4–8.2)
PROT UR STRIP-MCNC: NEGATIVE MG/DL
RBC # BLD AUTO: 4.51 MILLION/UL (ref 3.81–5.12)
RBC #/AREA URNS AUTO: ABNORMAL /HPF
SODIUM SERPL-SCNC: 139 MMOL/L (ref 136–145)
SP GR UR STRIP.AUTO: 1.02 (ref 1–1.03)
TRIGL SERPL-MCNC: 153 MG/DL
TSH SERPL DL<=0.05 MIU/L-ACNC: 2.31 UIU/ML (ref 0.36–3.74)
UROBILINOGEN UR QL STRIP.AUTO: 0.2 E.U./DL
WBC # BLD AUTO: 6.45 THOUSAND/UL (ref 4.31–10.16)
WBC #/AREA URNS AUTO: ABNORMAL /HPF

## 2019-12-13 PROCEDURE — 80061 LIPID PANEL: CPT

## 2019-12-13 PROCEDURE — 85025 COMPLETE CBC W/AUTO DIFF WBC: CPT

## 2019-12-13 PROCEDURE — 81001 URINALYSIS AUTO W/SCOPE: CPT

## 2019-12-13 PROCEDURE — 80053 COMPREHEN METABOLIC PANEL: CPT

## 2019-12-13 PROCEDURE — 83036 HEMOGLOBIN GLYCOSYLATED A1C: CPT

## 2019-12-13 PROCEDURE — 84443 ASSAY THYROID STIM HORMONE: CPT

## 2019-12-13 PROCEDURE — 84146 ASSAY OF PROLACTIN: CPT

## 2019-12-13 PROCEDURE — 82306 VITAMIN D 25 HYDROXY: CPT

## 2019-12-13 PROCEDURE — 36415 COLL VENOUS BLD VENIPUNCTURE: CPT

## 2019-12-29 ENCOUNTER — HOSPITAL ENCOUNTER (EMERGENCY)
Facility: HOSPITAL | Age: 64
Discharge: HOME | End: 2019-12-29
Attending: EMERGENCY MEDICINE
Payer: COMMERCIAL

## 2019-12-29 VITALS
SYSTOLIC BLOOD PRESSURE: 190 MMHG | DIASTOLIC BLOOD PRESSURE: 108 MMHG | HEART RATE: 84 BPM | TEMPERATURE: 98.2 F | RESPIRATION RATE: 18 BRPM | OXYGEN SATURATION: 98 %

## 2019-12-29 DIAGNOSIS — K04.7 DENTAL ABSCESS: Primary | ICD-10-CM

## 2019-12-29 PROCEDURE — 99283 EMERGENCY DEPT VISIT LOW MDM: CPT | Performed by: EMERGENCY MEDICINE

## 2019-12-29 PROCEDURE — 99282 EMERGENCY DEPT VISIT SF MDM: CPT

## 2019-12-29 PROCEDURE — 41006 DRAINAGE OF MOUTH LESION: CPT | Performed by: EMERGENCY MEDICINE

## 2019-12-29 RX ORDER — ACETAMINOPHEN 325 MG/1
975 TABLET ORAL ONCE
Status: COMPLETED | OUTPATIENT
Start: 2019-12-29 | End: 2019-12-29

## 2019-12-29 RX ORDER — AMOXICILLIN AND CLAVULANATE POTASSIUM 875; 125 MG/1; MG/1
1 TABLET, FILM COATED ORAL EVERY 12 HOURS
Qty: 14 TABLET | Refills: 0 | Status: SHIPPED | OUTPATIENT
Start: 2019-12-29 | End: 2020-01-05

## 2019-12-29 RX ORDER — ACETAMINOPHEN 500 MG
500 TABLET ORAL EVERY 6 HOURS PRN
Qty: 30 TABLET | Refills: 0 | Status: SHIPPED | OUTPATIENT
Start: 2019-12-29

## 2019-12-29 RX ORDER — AMOXICILLIN AND CLAVULANATE POTASSIUM 875; 125 MG/1; MG/1
1 TABLET, FILM COATED ORAL ONCE
Status: COMPLETED | OUTPATIENT
Start: 2019-12-29 | End: 2019-12-29

## 2019-12-29 RX ORDER — LIDOCAINE HYDROCHLORIDE 10 MG/ML
10 INJECTION, SOLUTION EPIDURAL; INFILTRATION; INTRACAUDAL; PERINEURAL ONCE
Status: COMPLETED | OUTPATIENT
Start: 2019-12-29 | End: 2019-12-29

## 2019-12-29 RX ADMIN — ACETAMINOPHEN 975 MG: 325 TABLET ORAL at 17:58

## 2019-12-29 RX ADMIN — LIDOCAINE HYDROCHLORIDE 10 ML: 10 INJECTION, SOLUTION EPIDURAL; INFILTRATION; INTRACAUDAL; PERINEURAL at 18:00

## 2019-12-29 RX ADMIN — AMOXICILLIN AND CLAVULANATE POTASSIUM 1 TABLET: 875; 125 TABLET, FILM COATED ORAL at 17:58

## 2019-12-29 NOTE — DISCHARGE INSTRUCTIONS
Dental clinics:  [facility]:  [address] [place], [phone]  [facility] dental clinic:  [address], [address] [phone]  [address]:  [address], [phone]

## 2019-12-29 NOTE — ED ATTENDING ATTESTATION
12/29/2019  IFaustino MD, saw and evaluated the patient  I have discussed the patient with the resident/non-physician practitioner and agree with the resident's/non-physician practitioner's findings, Plan of Care, and MDM as documented in the resident's/non-physician practitioner's note, except where noted  All available labs and Radiology studies were reviewed  I was present for key portions of any procedure(s) performed by the resident/non-physician practitioner and I was immediately available to provide assistance  At this point I agree with the current assessment done in the Emergency Department    I have conducted an independent evaluation of this patient a history and physical is as follows:   Pt has prosthetic tooth placed  Now increasing pain and swelling  No fevers PE:alert abscess noted in area  Of tooth upper molar MDM: will I and d antibiotics  ED Course         Critical Care Time  Procedures

## 2019-12-29 NOTE — ED PROVIDER NOTES
History  Chief Complaint   Patient presents with    Dental Pain     Pt has c/o R upper dental pain     28-year-old female presents to the emergency department for evaluation of dental pain on the 3rd tooth  Patient states that 2 years ago she had a cap place with no root is of the past 2-3 days has been having increasing pain and swelling at that site  Patient states the pain is localized to tooth nothing makes it better and she has been taking Motrin with Tylenol without improvement  Patient fever, chills, nausea, vomiting, abdominal pain, chest pain, shortness of breath, dyspnea, trismus, globus sensation          Prior to Admission Medications   Prescriptions Last Dose Informant Patient Reported? Taking? LORazepam (ATIVAN) 1 mg tablet  Self Yes No   Sig: take 1 tablet by mouth daily (TAKE AS NEEDED)   MAPAP 500 MG tablet  Self Yes No   Sig: take 2 tablets (1,000 MG) by mouth every 8 hours if needed for pain   RA ASPIRIN EC 81 MG EC tablet   Yes No   atenolol (TENORMIN) 50 mg tablet  Self Yes No   buPROPion (WELLBUTRIN SR) 150 mg 12 hr tablet   Yes No   gabapentin (NEURONTIN) 300 mg capsule   No No   Sig: Take 1 capsule (300 mg total) by mouth daily at bedtime for 30 days   Patient not taking: Reported on 11/6/2019   losartan (COZAAR) 25 mg tablet   Yes No   omeprazole (PriLOSEC) 20 mg delayed release capsule  Self Yes No   Sig: take 1 capsule (20 MG) by mouth once daily A 1/2 AN HOUR BEFORE B   (REFER TO PRESCRIPTION NOTES)     pravastatin (PRAVACHOL) 10 mg tablet   Yes No   risperiDONE (RisperDAL) 1 mg tablet   Yes No   sertraline (ZOLOFT) 100 mg tablet   Yes No   zolpidem (AMBIEN) 10 mg tablet  Self Yes No   Sig: take 1 tablet by mouth at bedtime for ( TAKE FOR SLEEP)      Facility-Administered Medications: None       Past Medical History:   Diagnosis Date    Chronic back pain     Dizziness     GERD (gastroesophageal reflux disease)     HL (hearing loss)     Peripheral edema     last assessed 06/23/17       Past Surgical History:   Procedure Laterality Date    BLADDER SURGERY      last assessed 05/09/17    ELBOW SURGERY      last assessed 05/09/17    FOOT SURGERY      last assessed 05/09/17    INTRACRANIAL ANEURYSM REPAIR      last assessed 05/09/17       Family History   Adopted: Yes   Family history unknown: Yes     I have reviewed and agree with the history as documented  Social History     Tobacco Use    Smoking status: Current Some Day Smoker     Packs/day: 0 20     Types: Cigarettes    Smokeless tobacco: Former User   Substance Use Topics    Alcohol use: No    Drug use: No        Review of Systems   Constitutional: Negative for chills, diaphoresis, fatigue and fever  HENT: Positive for dental problem  Negative for congestion, ear discharge, facial swelling, hearing loss, rhinorrhea, sinus pressure, sinus pain, sneezing, sore throat, tinnitus and trouble swallowing  Eyes: Negative for pain, discharge and redness  Respiratory: Negative for cough, choking, chest tightness, shortness of breath, wheezing and stridor  Cardiovascular: Negative for chest pain, palpitations and leg swelling  Gastrointestinal: Negative for abdominal distention, abdominal pain, blood in stool, constipation, diarrhea, nausea and vomiting  Endocrine: Negative for cold intolerance, polydipsia and polyuria  Genitourinary: Negative for difficulty urinating, dysuria, enuresis, flank pain, frequency and hematuria  Musculoskeletal: Negative for arthralgias, back pain, gait problem and neck stiffness  Skin: Negative for rash and wound  Neurological: Negative for dizziness, seizures, syncope, weakness, numbness and headaches  Hematological: Negative for adenopathy  Psychiatric/Behavioral: Negative for agitation, confusion, hallucinations, sleep disturbance and suicidal ideas  All other systems reviewed and are negative        Physical Exam  ED Triage Vitals [12/29/19 1655]   Temperature Pulse Respirations Blood Pressure SpO2   98 2 °F (36 8 °C) 84 18 (!) 190/108 98 %      Temp Source Heart Rate Source Patient Position - Orthostatic VS BP Location FiO2 (%)   Oral Monitor Sitting Left arm --      Pain Score       9             Orthostatic Vital Signs  Vitals:    12/29/19 1655   BP: (!) 190/108   Pulse: 84   Patient Position - Orthostatic VS: Sitting       Physical Exam   Constitutional: She is oriented to person, place, and time  She appears well-developed and well-nourished  No distress  HENT:   Head: Normocephalic and atraumatic  Right Ear: External ear normal    Left Ear: External ear normal    Nose: No sinus tenderness  No epistaxis  Mouth/Throat: No oropharyngeal exudate  Eyes: Pupils are equal, round, and reactive to light  Conjunctivae and EOM are normal  Right eye exhibits no discharge  Left eye exhibits no discharge  Neck: No JVD present  Cardiovascular: Normal rate, regular rhythm, normal heart sounds and intact distal pulses  Exam reveals no gallop and no friction rub  No murmur heard  Pulmonary/Chest: Effort normal and breath sounds normal  No stridor  No respiratory distress  She has no wheezes  She has no rales  Abdominal: Soft  Bowel sounds are normal  She exhibits no distension and no mass  There is no tenderness  There is no rebound and no guarding  Musculoskeletal: Normal range of motion  She exhibits no edema, tenderness or deformity  Lymphadenopathy:     She has no cervical adenopathy  Neurological: She is alert and oriented to person, place, and time  She has normal strength  No cranial nerve deficit or sensory deficit  GCS eye subscore is 4  GCS verbal subscore is 5  GCS motor subscore is 6  Reflex Scores:       Patellar reflexes are 2+ on the right side and 2+ on the left side  UE and LE 5/5 strength, No focal neuro deficits  Skin: Skin is warm, dry and intact  Capillary refill takes less than 2 seconds  She is not diaphoretic     Psychiatric: She has a normal mood and affect  Her speech is normal and behavior is normal  Judgment and thought content normal    Nursing note and vitals reviewed  ED Medications  Medications   acetaminophen (TYLENOL) tablet 975 mg (975 mg Oral Given 12/29/19 1758)   lidocaine (PF) (XYLOCAINE-MPF) 1 % injection 10 mL (10 mL Infiltration Given 12/29/19 1800)   amoxicillin-clavulanate (AUGMENTIN) 875-125 mg per tablet 1 tablet (1 tablet Oral Given 12/29/19 1758)       Diagnostic Studies  Results Reviewed     None                 No orders to display         Procedures  Incision and drain  Date/Time: 12/29/2019 6:12 PM  Performed by: Vivian Hollis DO  Authorized by: Vivian Hollis DO     Patient location:  ED  Other Assisting Provider: Yes (comment)    Consent:     Consent obtained:  Verbal    Consent given by:  Patient    Risks discussed:  Bleeding, damage to other organs, incomplete drainage, infection and pain    Alternatives discussed:  No treatment  Universal protocol:     Patient identity confirmed:  Verbally with patient  Location:     Type:  Abscess    Size:  1 5 cm    Location:  Mouth    Location Detail:  Intraoral    Mouth location:  Alveolar process  Procedure details:     Complexity:  Simple    Needle aspiration: no      Incision types:  Stab incision    Scalpel blade:  10    Approach:  Open    Drainage:  Bloody and purulent    Drainage amount: Moderate    Wound treatment:  Wound left open    Packing materials:  None  Post-procedure details:     Patient tolerance of procedure: Tolerated well, no immediate complications          ED Course                               MDM  Number of Diagnoses or Management Options  Dental abscess: new and requires workup  Diagnosis management comments: 1   Dental abscess  -Augmentin b i d  X7 days  -Tylenol 500 mg q 4 hours  -ED as needed  -PCP follow-up  -dental clinic information provided        Disposition  Final diagnoses:   Dental abscess     Time reflects when diagnosis was documented in both MDM as applicable and the Disposition within this note     Time User Action Codes Description Comment    12/29/2019  6:15 PM Shireen Candelario Add [K04 7] Dental abscess       ED Disposition     ED Disposition Condition Date/Time Comment    Discharge Stable Sun Dec 29, 2019  6:15 PM Byron Falling discharge to home/self care  Follow-up Information     Follow up With Specialties Details Why Contact Info Additional Valeria [address], [address]  Nurse Practitioner Schedule an appointment as soon as possible for a visit in 1 day  [place] [address] [place] [address] Emergency Department Emergency Medicine Go to  If symptoms worsen, As needed [address] [facility], [address], [place], [address]   [phone]          Patient's Medications   Discharge Prescriptions    ACETAMINOPHEN (TYLENOL) 500 MG TABLET    Take 1 tablet (500 mg total) by mouth every 6 (six) hours as needed for moderate pain       Start Date: 12/29/2019End Date: --       Order Dose: 500 mg       Quantity: 30 tablet    Refills: 0    AMOXICILLIN-CLAVULANATE (AUGMENTIN) 875-125 MG PER TABLET    Take 1 tablet by mouth every 12 (twelve) hours for 7 days       Start Date: 12/29/2019End Date: 1/5/2020       Order Dose: 1 tablet       Quantity: 14 tablet    Refills: 0     No discharge procedures on file  ED Provider  Attending physically available and evaluated Byron Ramirez  I managed the patient along with the ED Attending      Electronically Signed by         Franco Verduzco DO  12/29/19 5893

## 2020-07-30 ENCOUNTER — TRANSCRIBE ORDERS (OUTPATIENT)
Dept: ADMINISTRATIVE | Facility: HOSPITAL | Age: 65
End: 2020-07-30

## 2020-07-30 DIAGNOSIS — Z12.31 ENCOUNTER FOR SCREENING MAMMOGRAM FOR MALIGNANT NEOPLASM OF BREAST: Primary | ICD-10-CM

## 2020-09-24 ENCOUNTER — TRANSCRIBE ORDERS (OUTPATIENT)
Dept: RADIOLOGY | Facility: HOSPITAL | Age: 65
End: 2020-09-24

## 2020-09-28 ENCOUNTER — HOSPITAL ENCOUNTER (OUTPATIENT)
Dept: RADIOLOGY | Facility: HOSPITAL | Age: 65
Discharge: HOME | End: 2020-09-28
Payer: COMMERCIAL

## 2020-09-28 ENCOUNTER — TRANSCRIBE ORDERS (OUTPATIENT)
Dept: RADIOLOGY | Facility: HOSPITAL | Age: 65
End: 2020-09-28

## 2020-09-28 DIAGNOSIS — M21.961 ACQUIRED DEFORMITY OF RIGHT FOOT: ICD-10-CM

## 2020-09-28 DIAGNOSIS — M48.061 SPINAL STENOSIS, LUMBAR REGION, WITHOUT NEUROGENIC CLAUDICATION: ICD-10-CM

## 2020-09-28 DIAGNOSIS — M25.571 RIGHT ANKLE PAIN, UNSPECIFIED CHRONICITY: Primary | ICD-10-CM

## 2020-09-28 PROCEDURE — 73630 X-RAY EXAM OF FOOT: CPT

## 2020-09-28 PROCEDURE — 72100 X-RAY EXAM L-S SPINE 2/3 VWS: CPT

## 2020-10-14 ENCOUNTER — LAB (OUTPATIENT)
Dept: LAB | Facility: HOSPITAL | Age: 65
End: 2020-10-14
Payer: COMMERCIAL

## 2020-10-14 DIAGNOSIS — M25.571 RIGHT ANKLE PAIN, UNSPECIFIED CHRONICITY: ICD-10-CM

## 2020-10-14 LAB
BASOPHILS # BLD AUTO: 0.06 THOUSANDS/ΜL (ref 0–0.1)
BASOPHILS NFR BLD AUTO: 1 % (ref 0–1)
EOSINOPHIL # BLD AUTO: 0.28 THOUSAND/ΜL (ref 0–0.61)
EOSINOPHIL NFR BLD AUTO: 4 % (ref 0–6)
ERYTHROCYTE [DISTWIDTH] IN BLOOD BY AUTOMATED COUNT: 13.1 % (ref 11.6–15.1)
HCT VFR BLD AUTO: 44.1 % (ref 34.8–46.1)
HGB BLD-MCNC: 14 G/DL (ref 11.5–15.4)
IMM GRANULOCYTES # BLD AUTO: 0.05 THOUSAND/UL (ref 0–0.2)
IMM GRANULOCYTES NFR BLD AUTO: 1 % (ref 0–2)
LYMPHOCYTES # BLD AUTO: 1.57 THOUSANDS/ΜL (ref 0.6–4.47)
LYMPHOCYTES NFR BLD AUTO: 21 % (ref 14–44)
MCH RBC QN AUTO: 31 PG (ref 26.8–34.3)
MCHC RBC AUTO-ENTMCNC: 31.7 G/DL (ref 31.4–37.4)
MCV RBC AUTO: 98 FL (ref 82–98)
MONOCYTES # BLD AUTO: 0.6 THOUSAND/ΜL (ref 0.17–1.22)
MONOCYTES NFR BLD AUTO: 8 % (ref 4–12)
NEUTROPHILS # BLD AUTO: 4.79 THOUSANDS/ΜL (ref 1.85–7.62)
NEUTS SEG NFR BLD AUTO: 65 % (ref 43–75)
NRBC BLD AUTO-RTO: 0 /100 WBCS
PLATELET # BLD AUTO: 205 THOUSANDS/UL (ref 149–390)
PMV BLD AUTO: 11.5 FL (ref 8.9–12.7)
RBC # BLD AUTO: 4.52 MILLION/UL (ref 3.81–5.12)
URATE SERPL-MCNC: 5.3 MG/DL (ref 2–6.8)
WBC # BLD AUTO: 7.35 THOUSAND/UL (ref 4.31–10.16)

## 2020-10-14 PROCEDURE — 86430 RHEUMATOID FACTOR TEST QUAL: CPT

## 2020-10-14 PROCEDURE — 85025 COMPLETE CBC W/AUTO DIFF WBC: CPT

## 2020-10-14 PROCEDURE — 86038 ANTINUCLEAR ANTIBODIES: CPT

## 2020-10-14 PROCEDURE — 84550 ASSAY OF BLOOD/URIC ACID: CPT

## 2020-10-14 PROCEDURE — 36415 COLL VENOUS BLD VENIPUNCTURE: CPT

## 2020-10-15 LAB — RHEUMATOID FACT SER QL LA: NEGATIVE

## 2020-10-16 LAB — RYE IGE QN: NEGATIVE

## 2020-11-17 ENCOUNTER — TELEPHONE (OUTPATIENT)
Dept: OBGYN CLINIC | Facility: HOSPITAL | Age: 65
End: 2020-11-17

## 2021-04-14 ENCOUNTER — APPOINTMENT (OUTPATIENT)
Dept: LAB | Facility: HOSPITAL | Age: 66
End: 2021-04-14
Payer: COMMERCIAL

## 2021-04-14 ENCOUNTER — TRANSCRIBE ORDERS (OUTPATIENT)
Dept: LAB | Facility: HOSPITAL | Age: 66
End: 2021-04-14

## 2021-04-14 DIAGNOSIS — Z13.6 SCREENING FOR ISCHEMIC HEART DISEASE: ICD-10-CM

## 2021-04-14 DIAGNOSIS — Z00.00 ROUTINE GENERAL MEDICAL EXAMINATION AT A HEALTH CARE FACILITY: Primary | ICD-10-CM

## 2021-04-14 DIAGNOSIS — Z12.11 SPECIAL SCREENING FOR MALIGNANT NEOPLASMS, COLON: ICD-10-CM

## 2021-04-14 DIAGNOSIS — Z00.00 ROUTINE GENERAL MEDICAL EXAMINATION AT A HEALTH CARE FACILITY: ICD-10-CM

## 2021-04-14 LAB
ALBUMIN SERPL BCP-MCNC: 3.5 G/DL (ref 3.5–5)
ALP SERPL-CCNC: 82 U/L (ref 46–116)
ALT SERPL W P-5'-P-CCNC: 23 U/L (ref 12–78)
ANION GAP SERPL CALCULATED.3IONS-SCNC: 6 MMOL/L (ref 4–13)
AST SERPL W P-5'-P-CCNC: 9 U/L (ref 5–45)
BILIRUB SERPL-MCNC: 0.38 MG/DL (ref 0.2–1)
BUN SERPL-MCNC: 25 MG/DL (ref 5–25)
CALCIUM SERPL-MCNC: 8.7 MG/DL (ref 8.3–10.1)
CHLORIDE SERPL-SCNC: 111 MMOL/L (ref 100–108)
CHOLEST SERPL-MCNC: 183 MG/DL (ref 50–200)
CO2 SERPL-SCNC: 25 MMOL/L (ref 21–32)
CREAT SERPL-MCNC: 1.04 MG/DL (ref 0.6–1.3)
GFR SERPL CREATININE-BSD FRML MDRD: 56 ML/MIN/1.73SQ M
GLUCOSE P FAST SERPL-MCNC: 110 MG/DL (ref 65–99)
HCV AB SER QL: NORMAL
HDLC SERPL-MCNC: 51 MG/DL
LDLC SERPL CALC-MCNC: 108 MG/DL (ref 0–100)
NONHDLC SERPL-MCNC: 132 MG/DL
POTASSIUM SERPL-SCNC: 4.3 MMOL/L (ref 3.5–5.3)
PROT SERPL-MCNC: 7.3 G/DL (ref 6.4–8.2)
SODIUM SERPL-SCNC: 142 MMOL/L (ref 136–145)
TRIGL SERPL-MCNC: 121 MG/DL

## 2021-04-14 PROCEDURE — 80061 LIPID PANEL: CPT

## 2021-04-14 PROCEDURE — 80053 COMPREHEN METABOLIC PANEL: CPT

## 2021-04-14 PROCEDURE — 86803 HEPATITIS C AB TEST: CPT

## 2021-04-14 PROCEDURE — 36415 COLL VENOUS BLD VENIPUNCTURE: CPT

## 2021-04-14 PROCEDURE — 87389 HIV-1 AG W/HIV-1&-2 AB AG IA: CPT

## 2021-04-15 LAB — HIV 1+2 AB+HIV1 P24 AG SERPL QL IA: NORMAL

## 2021-06-08 ENCOUNTER — TELEPHONE (OUTPATIENT)
Dept: OBGYN CLINIC | Facility: HOSPITAL | Age: 66
End: 2021-06-08

## 2021-06-08 NOTE — TELEPHONE ENCOUNTER
Patient called to schedule for her ankle  Patient states the distance is too far  She can only come to the office in [place]  Is this something Dr Milan Robles would be willing to treat      Call back # [phone]

## 2021-06-08 NOTE — TELEPHONE ENCOUNTER
Dr [facility]    [phone]    New patient that had surgery on her right ankle 4 years ago through [facility]  She is unable to get surgical records  Are you able to see her without the records? Please advise

## 2021-07-20 ENCOUNTER — HOSPITAL ENCOUNTER (OUTPATIENT)
Dept: RADIOLOGY | Facility: HOSPITAL | Age: 66
Discharge: HOME | End: 2021-07-20
Payer: COMMERCIAL

## 2021-07-20 DIAGNOSIS — R10.9 ABDOMINAL PAIN: ICD-10-CM

## 2021-07-20 PROCEDURE — 76705 ECHO EXAM OF ABDOMEN: CPT

## 2021-10-08 ENCOUNTER — LAB REQUISITION (OUTPATIENT)
Dept: LAB | Facility: HOSPITAL | Age: 66
End: 2021-10-08
Payer: COMMERCIAL

## 2021-10-08 DIAGNOSIS — G89.4 CHRONIC PAIN SYNDROME: ICD-10-CM

## 2021-10-08 PROCEDURE — 80307 DRUG TEST PRSMV CHEM ANLYZR: CPT | Performed by: FAMILY MEDICINE

## 2021-10-11 LAB
LABORATORY COMMENT REPORT: NORMAL
TRAMADOL UR QL SCN: NEGATIVE NG/ML

## 2022-02-21 ENCOUNTER — HOSPITAL ENCOUNTER (OUTPATIENT)
Dept: RADIOLOGY | Facility: HOSPITAL | Age: 67
Discharge: HOME | End: 2022-02-21
Payer: MEDICARE

## 2022-02-21 ENCOUNTER — APPOINTMENT (OUTPATIENT)
Dept: LAB | Facility: HOSPITAL | Age: 67
End: 2022-02-21
Payer: MEDICARE

## 2022-02-21 DIAGNOSIS — E78.5 HYPERLIPIDEMIA, UNSPECIFIED HYPERLIPIDEMIA TYPE: ICD-10-CM

## 2022-02-21 DIAGNOSIS — M25.571 RIGHT ANKLE PAIN, UNSPECIFIED CHRONICITY: ICD-10-CM

## 2022-02-21 LAB
ALBUMIN SERPL BCP-MCNC: 3.3 G/DL (ref 3.5–5)
ALP SERPL-CCNC: 81 U/L (ref 46–116)
ALT SERPL W P-5'-P-CCNC: 22 U/L (ref 12–78)
ANION GAP SERPL CALCULATED.3IONS-SCNC: 5 MMOL/L (ref 4–13)
AST SERPL W P-5'-P-CCNC: 11 U/L (ref 5–45)
BILIRUB SERPL-MCNC: 0.44 MG/DL (ref 0.2–1)
BUN SERPL-MCNC: 13 MG/DL (ref 5–25)
CALCIUM ALBUM COR SERPL-MCNC: 9.7 MG/DL (ref 8.3–10.1)
CALCIUM SERPL-MCNC: 9.1 MG/DL (ref 8.3–10.1)
CHLORIDE SERPL-SCNC: 111 MMOL/L (ref 100–108)
CHOLEST SERPL-MCNC: 179 MG/DL
CO2 SERPL-SCNC: 25 MMOL/L (ref 21–32)
CREAT SERPL-MCNC: 1.01 MG/DL (ref 0.6–1.3)
GFR SERPL CREATININE-BSD FRML MDRD: 57 ML/MIN/1.73SQ M
GLUCOSE P FAST SERPL-MCNC: 106 MG/DL (ref 65–99)
HDLC SERPL-MCNC: 45 MG/DL
LDLC SERPL CALC-MCNC: 104 MG/DL (ref 0–100)
NONHDLC SERPL-MCNC: 134 MG/DL
POTASSIUM SERPL-SCNC: 4 MMOL/L (ref 3.5–5.3)
PROT SERPL-MCNC: 7.2 G/DL (ref 6.4–8.2)
SODIUM SERPL-SCNC: 141 MMOL/L (ref 136–145)
TRIGL SERPL-MCNC: 150 MG/DL

## 2022-02-21 PROCEDURE — 36415 COLL VENOUS BLD VENIPUNCTURE: CPT

## 2022-02-21 PROCEDURE — 80061 LIPID PANEL: CPT

## 2022-02-21 PROCEDURE — 80053 COMPREHEN METABOLIC PANEL: CPT

## 2022-02-21 PROCEDURE — 73610 X-RAY EXAM OF ANKLE: CPT

## 2022-02-25 ENCOUNTER — HOSPITAL ENCOUNTER (EMERGENCY)
Facility: HOSPITAL | Age: 67
Discharge: HOME | End: 2022-02-25
Attending: EMERGENCY MEDICINE | Admitting: EMERGENCY MEDICINE
Payer: MEDICARE

## 2022-02-25 ENCOUNTER — APPOINTMENT (EMERGENCY)
Dept: RADIOLOGY | Facility: HOSPITAL | Age: 67
End: 2022-02-25
Payer: MEDICARE

## 2022-02-25 VITALS
SYSTOLIC BLOOD PRESSURE: 179 MMHG | TEMPERATURE: 98 F | OXYGEN SATURATION: 98 % | DIASTOLIC BLOOD PRESSURE: 77 MMHG | RESPIRATION RATE: 20 BRPM | HEART RATE: 79 BPM

## 2022-02-25 DIAGNOSIS — M72.2 PLANTAR FASCIITIS OF LEFT FOOT: ICD-10-CM

## 2022-02-25 DIAGNOSIS — R03.0 ELEVATED BLOOD PRESSURE READING: ICD-10-CM

## 2022-02-25 DIAGNOSIS — M79.672 ACUTE FOOT PAIN, LEFT: Primary | ICD-10-CM

## 2022-02-25 DIAGNOSIS — M25.572 ACUTE LEFT ANKLE PAIN: ICD-10-CM

## 2022-02-25 PROCEDURE — 73610 X-RAY EXAM OF ANKLE: CPT

## 2022-02-25 PROCEDURE — 73630 X-RAY EXAM OF FOOT: CPT

## 2022-02-25 PROCEDURE — 99283 EMERGENCY DEPT VISIT LOW MDM: CPT

## 2022-02-25 PROCEDURE — 99284 EMERGENCY DEPT VISIT MOD MDM: CPT | Performed by: EMERGENCY MEDICINE

## 2022-02-25 RX ADMIN — DICLOFENAC SODIUM 2 G: 10 GEL TOPICAL at 10:50

## 2022-02-25 NOTE — ED PROVIDER NOTES
History  Chief Complaint   Patient presents with    Foot Pain     pt c o Left foot pain  pt denies injury     Patient is a 71-year-old female, with a history significant for myofascial pain, peripheral edema, tobacco use, who presents to the ED today due to sudden onset and atraumatic left foot pain  This pain is sharp in character, severe intensity, and located predominantly in the plantar aspect of her left foot  The pain is been constant since the onset and progressively worsening  Standing exacerbates her symptoms  Patient has taken Tylenol, 2 g daily, to try to remit her symptoms without affect  Patient states that she is otherwise in her normal state of health and she is without other concerns at this time  Patient denies fever, chest pain, shortness of breath, calf pain, weakness, numbness on history     - No language barrier    - History obtained from patient  - There are no limitations to the history obtained  - Previous charting was reviewed          Prior to Admission Medications   Prescriptions Last Dose Informant Patient Reported? Taking?    LORazepam (ATIVAN) 1 mg tablet  Self Yes No   Sig: take 1 tablet by mouth daily (TAKE AS NEEDED)   MAPAP 500 MG tablet  Self Yes No   Sig: take 2 tablets (1,000 MG) by mouth every 8 hours if needed for pain   RA ASPIRIN EC 81 MG EC tablet   Yes No   acetaminophen (TYLENOL) 500 mg tablet   No No   Sig: Take 1 tablet (500 mg total) by mouth every 6 (six) hours as needed for moderate pain   atenolol (TENORMIN) 50 mg tablet  Self Yes No   buPROPion (WELLBUTRIN SR) 150 mg 12 hr tablet   Yes No   gabapentin (NEURONTIN) 300 mg capsule   No No   Sig: Take 1 capsule (300 mg total) by mouth daily at bedtime for 30 days   Patient not taking: Reported on 11/6/2019   losartan (COZAAR) 25 mg tablet   Yes No   omeprazole (PriLOSEC) 20 mg delayed release capsule  Self Yes No   Sig: take 1 capsule (20 MG) by mouth once daily A 1/2 AN HOUR BEFORE B   (REFER TO PRESCRIPTION NOTES)  pravastatin (PRAVACHOL) 10 mg tablet   Yes No   risperiDONE (RisperDAL) 1 mg tablet   Yes No   sertraline (ZOLOFT) 100 mg tablet   Yes No   zolpidem (AMBIEN) 10 mg tablet  Self Yes No   Sig: take 1 tablet by mouth at bedtime for ( TAKE FOR SLEEP)      Facility-Administered Medications: None       Past Medical History:   Diagnosis Date    Chronic back pain     Dizziness     GERD (gastroesophageal reflux disease)     HL (hearing loss)     Peripheral edema     last assessed 06/23/17       Past Surgical History:   Procedure Laterality Date    BLADDER SURGERY      last assessed 05/09/17    ELBOW SURGERY      last assessed 05/09/17    FOOT SURGERY      last assessed 05/09/17    INTRACRANIAL ANEURYSM REPAIR      last assessed 05/09/17       Family History   Adopted: Yes   Family history unknown: Yes     I have reviewed and agree with the history as documented  E-Cigarette/Vaping     E-Cigarette/Vaping Substances     Social History     Tobacco Use    Smoking status: Current Some Day Smoker     Packs/day: 0 20     Types: Cigarettes    Smokeless tobacco: Former User   Substance Use Topics    Alcohol use: No    Drug use: No        Review of Systems   Constitutional: Negative for fever  HENT: Negative for trouble swallowing  Eyes: Negative for visual disturbance  Respiratory: Negative for shortness of breath  Cardiovascular: Negative for chest pain  Gastrointestinal: Negative for abdominal pain  Endocrine: Negative for polyuria  Genitourinary: Negative for dysuria  Musculoskeletal: Positive for gait problem (Secondary to pain)  Skin: Negative for wound  Allergic/Immunologic: Positive for environmental allergies  Neurological: Negative for weakness and numbness  Hematological: Negative for adenopathy  Psychiatric/Behavioral: Negative for confusion  All other systems reviewed and are negative        Physical Exam  ED Triage Vitals   Temperature Pulse Respirations Blood Pressure SpO2   02/25/22 0916 02/25/22 0914 02/25/22 0914 02/25/22 0914 02/25/22 0914   98 °F (36 7 °C) 79 20 (!) 179/77 98 %      Temp src Heart Rate Source Patient Position - Orthostatic VS BP Location FiO2 (%)   -- 02/25/22 0914 -- -- --    Monitor         Pain Score       02/25/22 0916       9             Orthostatic Vital Signs  Vitals:    02/25/22 0914   BP: (!) 179/77   Pulse: 79       Physical Exam  Vitals and nursing note reviewed  Constitutional:       General: She is not in acute distress  Appearance: She is not ill-appearing or toxic-appearing  HENT:      Head: Normocephalic and atraumatic  Right Ear: External ear normal       Left Ear: External ear normal       Nose: Nose normal  No rhinorrhea  Mouth/Throat:      Mouth: Mucous membranes are moist       Pharynx: Oropharynx is clear  No oropharyngeal exudate or posterior oropharyngeal erythema  Eyes:      General: No scleral icterus  Right eye: No discharge  Left eye: No discharge  Conjunctiva/sclera: Conjunctivae normal       Pupils: Pupils are equal, round, and reactive to light  Neck:      Comments: Patient is spontaneously rotating their neck to the left and right during the history and physical exam interaction without difficulty or apparent discomfort    Cardiovascular:      Rate and Rhythm: Normal rate and regular rhythm  Heart sounds: Normal heart sounds  No murmur heard  No friction rub  No gallop  Comments: 1+ DP left    VIBHA   904 in the LLE  Pulmonary:      Effort: Pulmonary effort is normal  No respiratory distress  Breath sounds: Normal breath sounds  No stridor  No wheezing, rhonchi or rales  Abdominal:      General: Abdomen is flat  There is no distension  Palpations: Abdomen is soft  Tenderness: There is no abdominal tenderness  There is no right CVA tenderness, left CVA tenderness, guarding or rebound  Musculoskeletal:         General: Tenderness present   No deformity  Cervical back: Neck supple  No tenderness  No muscular tenderness  Comments: No proximal fibular head tenderness to palpation    Tenderness to palpation of the left medial malleolus and the plantar fascia insertion site anterior to the left heel  Very pronounced tenderness to palpation of the plantar fascia    No calf tenderness to palpation   Lymphadenopathy:      Cervical: No cervical adenopathy  Skin:     General: Skin is warm and dry  Capillary Refill: Capillary refill takes less than 2 seconds  Neurological:      Mental Status: She is alert  Comments: Patient is speaking clearly in complete sentences  Patient is answering appropriately and able follow commands  Patient is moving all four extremities spontaneously  No facial droop  Tongue midline  Patient is able to bear weight and ambulate, this is limited by pain   Psychiatric:         Mood and Affect: Mood normal          Behavior: Behavior normal          ED Medications  Medications   Diclofenac Sodium (VOLTAREN) 1 % topical gel 2 g (2 g Topical Given 2/25/22 1050)       Diagnostic Studies  Results Reviewed     None                 XR foot 3+ views LEFT   ED Interpretation by Faisal Wood MD (02/25 1021)   No acute osseous abnormality      Final Result by Brigitte Bear DO (02/25 1123)      No acute osseous abnormality  Workstation performed: [id number]         XR ankle 3+ views LEFT   ED Interpretation by Faisal Wood MD (02/25 1021)   No acute osseous abnormality      Final Result by Brigitte Bear DO (02/25 1119)      No acute osseous abnormality              Workstation performed: [id number]               Procedures  Procedures      ED Course  ED Course as of 02/25/22 1150   Fri Feb 25, 2022   1138 Patient had neither questions nor concerns after receiving discharge instructions and return precautions                                       MDM  Number of Diagnoses or Management Options  Acute foot pain, left  Acute left ankle pain  Elevated blood pressure reading  Plantar fasciitis of left foot  Diagnosis management comments: Patient is a 15-year-old female, with a history significant for myofascial pain, peripheral edema, tobacco use, who presents to the ED today due to sudden onset and atraumatic left foot pain  This pain is sharp in character, severe intensity, and located predominantly in the plantar aspect of her left foot  Patient is currently afebrile and hemodynamically stable  Her physical exam is notable for tenderness to palpation of the left plantar aspect of her foot as well as the left medial malleolus lb less than the plantar area  Her VIBHA is within normal limits  This presentation is concerning for:  Plantar fasciitis  I also answered heel spur, fracture  Low clinical suspicion for VTE/acute ischemic limb at this time based upon history and physical exam   Will investigate with x-rays of the left foot and ankle  Will manage with diclofenac gel as the patient has a GFR less than 60  Will refer to Podiatry and recommend stretching/sole inserts  Disposition  Final diagnoses:   Acute foot pain, left   Acute left ankle pain   Elevated blood pressure reading   Plantar fasciitis of left foot     Time reflects when diagnosis was documented in both MDM as applicable and the Disposition within this note     Time User Action Codes Description Comment    2/25/2022 10:16 AM Brooke Rater A Add [C52 618] Acute foot pain, left     2/25/2022 10:16 AM Brooke Rater A Add [M25 572] Acute left ankle pain     2/25/2022 10:16 AM LegNir kim A Add [R03 0] Elevated blood pressure reading     2/25/2022 10:18 AM Brooke Rater A Add [M72 2] Plantar fasciitis of left foot       ED Disposition     ED Disposition Condition Date/Time Comment    Discharge Stable Fri Feb 25, 2022 11:33 AM Nicholos Boas discharge to home/self care  Follow-up Information     Follow up With Specialties Details Why Contact Info Additional Michaela 74, CRNP Nurse Practitioner Schedule an appointment as soon as possible for a visit   [facility] Schedule an appointment as soon as possible for a visit   [address] [facility] [id number] [address], [address] [place] [address], [id number] [phone]          Patient's Medications   Discharge Prescriptions    DICLOFENAC SODIUM (VOLTAREN) 1 %    Apply 2 g topically 4 (four) times a day       Start Date: 2/25/2022 End Date: --       Order Dose: 2 g       Quantity: 100 g    Refills: 0         PDMP Review     None           ED Provider  Attending physically available and evaluated Jakob Garcia I managed the patient along with the ED Attending      Electronically Signed by         Brooke Stiles MD  02/25/22 0461

## 2022-02-25 NOTE — ED ATTENDING ATTESTATION
2/25/2022  IJosue MD, saw and evaluated the patient  I have discussed the patient with the resident/non-physician practitioner and agree with the resident's/non-physician practitioner's findings, Plan of Care, and MDM as documented in the resident's/non-physician practitioner's note, except where noted  All available labs and Radiology studies were reviewed  I was present for key portions of any procedure(s) performed by the resident/non-physician practitioner and I was immediately available to provide assistance  At this point I agree with the current assessment done in the Emergency Department  I have conducted an independent evaluation of this patient a history and physical is as follows:  Pt has 2 day history of l foot pain   No trauma Pain is worse with ambulation and in am  No fevers PE: alert foot is warm no redness good pulses tender plantar area MDM: will do xray  ED Course         Critical Care Time  Procedures

## 2022-02-25 NOTE — DISCHARGE INSTRUCTIONS
You were evaluated in the emergency department for: left foot pain  You can access your current and pending results through [facility]  A radiologist will take a second look at your X-Rays, if you had any, and you will be contacted with any new findings  You should follow-up with your primary care provider, as soon as possible, for re-evaluation  You should talk to your doctor about getting shoe inserts made: you can also purchase over the counter shoe inserts for plantar fasciitis  You should also stretch the sole of your foot  Your workup revealed no emergent features at this time; however, many disease processes are dynamic:    Please, return to the emergency department if you experience new or worsening symptoms, fever, chest pain, shortness of breath, difficulty breathing, dizziness, abdominal pain, persistent nausea/vomiting, syncope or passing out, blood in your urine or stool, coughing up blood, leg swelling/pain, urinary retention, bowel or bladder incontinence, numbness between your legs  Additionally, your blood pressure was measured to be high  This is something that you should discuss with your primary care provider and have re-checked within one week

## 2022-06-23 ENCOUNTER — LAB REQUISITION (OUTPATIENT)
Dept: LAB | Facility: HOSPITAL | Age: 67
End: 2022-06-23
Payer: MEDICARE

## 2022-06-23 DIAGNOSIS — G89.4 CHRONIC PAIN SYNDROME: ICD-10-CM

## 2022-06-23 PROCEDURE — 80307 DRUG TEST PRSMV CHEM ANLYZR: CPT | Performed by: FAMILY MEDICINE

## 2022-06-25 LAB
LABORATORY COMMENT REPORT: NORMAL
TRAMADOL UR QL SCN: NEGATIVE NG/ML

## 2023-06-20 ENCOUNTER — APPOINTMENT (OUTPATIENT)
Dept: LAB | Facility: CLINIC | Age: 68
End: 2023-06-20
Payer: MEDICARE

## 2023-06-20 DIAGNOSIS — R73.03 PREDIABETES: ICD-10-CM

## 2023-06-20 DIAGNOSIS — R19.09 OTHER INTRA-ABDOMINAL AND PELVIC SWELLING, MASS AND LUMP: ICD-10-CM

## 2023-06-20 LAB
ALBUMIN SERPL BCP-MCNC: 3.6 G/DL (ref 3.5–5)
ALP SERPL-CCNC: 68 U/L (ref 46–116)
ALT SERPL W P-5'-P-CCNC: 20 U/L (ref 12–78)
ANION GAP SERPL CALCULATED.3IONS-SCNC: 2 MMOL/L
AST SERPL W P-5'-P-CCNC: 11 U/L (ref 5–45)
BILIRUB SERPL-MCNC: 0.46 MG/DL (ref 0.2–1)
BUN SERPL-MCNC: 18 MG/DL (ref 5–25)
CALCIUM SERPL-MCNC: 9.1 MG/DL (ref 8.3–10.1)
CHLORIDE SERPL-SCNC: 111 MMOL/L (ref 96–108)
CHOLEST SERPL-MCNC: 199 MG/DL
CO2 SERPL-SCNC: 27 MMOL/L (ref 21–32)
CREAT SERPL-MCNC: 1.14 MG/DL (ref 0.6–1.3)
EST. AVERAGE GLUCOSE BLD GHB EST-MCNC: 111 MG/DL
GFR SERPL CREATININE-BSD FRML MDRD: 49 ML/MIN/1.73SQ M
GLUCOSE P FAST SERPL-MCNC: 111 MG/DL (ref 65–99)
HBA1C MFR BLD: 5.5 %
HDLC SERPL-MCNC: 49 MG/DL
LDLC SERPL CALC-MCNC: 119 MG/DL (ref 0–100)
NONHDLC SERPL-MCNC: 150 MG/DL
POTASSIUM SERPL-SCNC: 4 MMOL/L (ref 3.5–5.3)
PROT SERPL-MCNC: 7 G/DL (ref 6.4–8.4)
SODIUM SERPL-SCNC: 140 MMOL/L (ref 135–147)
TRIGL SERPL-MCNC: 154 MG/DL

## 2023-06-20 PROCEDURE — 36415 COLL VENOUS BLD VENIPUNCTURE: CPT

## 2023-06-20 PROCEDURE — 80061 LIPID PANEL: CPT

## 2023-06-20 PROCEDURE — 80053 COMPREHEN METABOLIC PANEL: CPT

## 2023-06-20 PROCEDURE — 83036 HEMOGLOBIN GLYCOSYLATED A1C: CPT

## 2023-06-23 PROCEDURE — 80307 DRUG TEST PRSMV CHEM ANLYZR: CPT | Performed by: NURSE PRACTITIONER

## 2023-06-24 ENCOUNTER — LAB REQUISITION (OUTPATIENT)
Dept: LAB | Facility: HOSPITAL | Age: 68
End: 2023-06-24
Payer: MEDICARE

## 2023-06-24 DIAGNOSIS — G89.4 CHRONIC PAIN SYNDROME: ICD-10-CM

## 2023-06-27 LAB
LABORATORY COMMENT REPORT: NORMAL
TRAMADOL UR QL SCN: NEGATIVE NG/ML

## 2023-08-13 ENCOUNTER — HOSPITAL ENCOUNTER (OUTPATIENT)
Dept: RADIOLOGY | Facility: HOSPITAL | Age: 68
Discharge: HOME | End: 2023-08-13
Payer: MEDICARE

## 2023-08-13 PROCEDURE — 74183 MRI ABD W/O CNTR FLWD CNTR: CPT

## 2023-08-13 PROCEDURE — A9585 GADOBUTROL INJECTION: HCPCS | Performed by: RADIOLOGY

## 2023-08-13 PROCEDURE — G1004 CDSM NDSC: HCPCS

## 2023-08-13 RX ORDER — GADOBUTROL 604.72 MG/ML
9 INJECTION INTRAVENOUS
Status: COMPLETED | OUTPATIENT
Start: 2023-08-13 | End: 2023-08-13

## 2023-08-13 RX ADMIN — GADOBUTROL 9 ML: 604.72 INJECTION INTRAVENOUS at 16:59

## 2023-08-17 ENCOUNTER — TELEPHONE (OUTPATIENT)
Dept: PODIATRY | Facility: CLINIC | Age: 68
End: 2023-08-17

## 2023-08-17 PROCEDURE — 80307 DRUG TEST PRSMV CHEM ANLYZR: CPT | Performed by: NURSE PRACTITIONER

## 2023-08-21 ENCOUNTER — LAB REQUISITION (OUTPATIENT)
Dept: LAB | Facility: HOSPITAL | Age: 68
End: 2023-08-21
Payer: MEDICARE

## 2023-08-21 DIAGNOSIS — G89.4 CHRONIC PAIN SYNDROME: ICD-10-CM

## 2023-08-23 LAB
LABORATORY COMMENT REPORT: NORMAL
TRAMADOL UR QL SCN: NEGATIVE NG/ML

## 2024-02-21 PROBLEM — Z12.11 COLON CANCER SCREENING: Status: RESOLVED | Noted: 2018-04-07 | Resolved: 2024-02-21

## 2025-06-10 ENCOUNTER — OFFICE VISIT (OUTPATIENT)
Dept: INTERNAL MEDICINE CLINIC | Facility: CLINIC | Age: 70
End: 2025-06-10
Payer: MEDICARE

## 2025-06-10 ENCOUNTER — APPOINTMENT (OUTPATIENT)
Dept: LAB | Facility: CLINIC | Age: 70
End: 2025-06-10
Attending: NURSE PRACTITIONER
Payer: MEDICARE

## 2025-06-10 VITALS
OXYGEN SATURATION: 98 % | HEIGHT: 60 IN | BODY MASS INDEX: 41.23 KG/M2 | HEART RATE: 73 BPM | WEIGHT: 210 LBS | SYSTOLIC BLOOD PRESSURE: 134 MMHG | TEMPERATURE: 97 F | DIASTOLIC BLOOD PRESSURE: 84 MMHG

## 2025-06-10 DIAGNOSIS — I10 BENIGN ESSENTIAL HYPERTENSION: ICD-10-CM

## 2025-06-10 DIAGNOSIS — N18.31 CKD STAGE G3A/A1, GFR 45-59 AND ALBUMIN CREATININE RATIO <30 MG/G (HCC): ICD-10-CM

## 2025-06-10 DIAGNOSIS — I10 BENIGN ESSENTIAL HYPERTENSION: Primary | ICD-10-CM

## 2025-06-10 DIAGNOSIS — E55.9 VITAMIN D DEFICIENCY: ICD-10-CM

## 2025-06-10 DIAGNOSIS — R73.03 PREDIABETES: ICD-10-CM

## 2025-06-10 DIAGNOSIS — F41.8 DEPRESSION WITH ANXIETY: ICD-10-CM

## 2025-06-10 PROBLEM — Z12.31 VISIT FOR SCREENING MAMMOGRAM: Status: RESOLVED | Noted: 2018-04-09 | Resolved: 2025-06-10

## 2025-06-10 PROBLEM — I67.1 POSTERIOR COMMUNICATING ARTERY ANEURYSM: Status: RESOLVED | Noted: 2017-05-09 | Resolved: 2025-06-10

## 2025-06-10 PROBLEM — G57.81 SURAL NEURITIS, RIGHT: Status: RESOLVED | Noted: 2017-07-18 | Resolved: 2025-06-10

## 2025-06-10 LAB
ALBUMIN SERPL BCG-MCNC: 4 G/DL (ref 3.5–5)
ALP SERPL-CCNC: 69 U/L (ref 34–104)
ALT SERPL W P-5'-P-CCNC: 13 U/L (ref 7–52)
ANION GAP SERPL CALCULATED.3IONS-SCNC: 6 MMOL/L (ref 4–13)
AST SERPL W P-5'-P-CCNC: 13 U/L (ref 13–39)
BILIRUB SERPL-MCNC: 0.49 MG/DL (ref 0.2–1)
BUN SERPL-MCNC: 25 MG/DL (ref 5–25)
CALCIUM SERPL-MCNC: 9.3 MG/DL (ref 8.4–10.2)
CHLORIDE SERPL-SCNC: 107 MMOL/L (ref 96–108)
CO2 SERPL-SCNC: 26 MMOL/L (ref 21–32)
CREAT SERPL-MCNC: 1.08 MG/DL (ref 0.6–1.3)
EST. AVERAGE GLUCOSE BLD GHB EST-MCNC: 128 MG/DL
GFR SERPL CREATININE-BSD FRML MDRD: 52 ML/MIN/1.73SQ M
GLUCOSE P FAST SERPL-MCNC: 106 MG/DL (ref 65–99)
HBA1C MFR BLD: 6.1 %
POTASSIUM SERPL-SCNC: 4.4 MMOL/L (ref 3.5–5.3)
PROT SERPL-MCNC: 7.2 G/DL (ref 6.4–8.4)
SODIUM SERPL-SCNC: 139 MMOL/L (ref 135–147)
TSH SERPL DL<=0.05 MIU/L-ACNC: 2.61 UIU/ML (ref 0.45–4.5)

## 2025-06-10 PROCEDURE — 83036 HEMOGLOBIN GLYCOSYLATED A1C: CPT

## 2025-06-10 PROCEDURE — 80053 COMPREHEN METABOLIC PANEL: CPT

## 2025-06-10 PROCEDURE — 99204 OFFICE O/P NEW MOD 45 MIN: CPT | Performed by: NURSE PRACTITIONER

## 2025-06-10 PROCEDURE — 36415 COLL VENOUS BLD VENIPUNCTURE: CPT

## 2025-06-10 PROCEDURE — 84443 ASSAY THYROID STIM HORMONE: CPT

## 2025-06-10 NOTE — ASSESSMENT & PLAN NOTE
Check A1C.   Orders:    TSH, 3rd generation with Free T4 reflex; Future    Hemoglobin A1C; Future

## 2025-06-10 NOTE — ASSESSMENT & PLAN NOTE
Recommend taking vit d3 2000 units daily.   Orders:    Calcium Carb-Cholecalciferol (calcium carbonate-vitamin D) 500 mg-5 mcg tablet; Take 1 tablet by mouth 2 (two) times a day with meals    Cholecalciferol 50 MCG (2000 UT) CAPS; Take 1 capsule (2,000 Units total) by mouth daily

## 2025-06-10 NOTE — ASSESSMENT & PLAN NOTE
Lab Results   Component Value Date    EGFR 62 12/27/2024    EGFR 58 (L) 06/13/2024    EGFR 57 (L) 11/28/2023    CREATININE 0.98 12/27/2024    CREATININE 1.04 06/13/2024    CREATININE 1.06 11/28/2023   Kidney function stable.  Avoid nsaids.

## 2025-06-10 NOTE — PROGRESS NOTES
Name: Dasha Anaya      : 1955      MRN: [id number]  Encounter Provider: ELSA Espinoza  Encounter Date: 6/10/2025   Encounter department: [facility] INTERNAL MEDICINE  :  Assessment & Plan  Benign essential hypertension  Stable.  On losartan.   Orders:  •  Comprehensive metabolic panel; Future    CKD stage G3a/A1, GFR 45-59 and albumin creatinine ratio <30 mg/g ([facility])  Lab Results   Component Value Date    EGFR 62 2024    EGFR 58 (L) 2024    EGFR 57 (L) 2023    CREATININE 0.98 2024    CREATININE 1.04 2024    CREATININE 1.06 2023   Kidney function stable.  Avoid nsaids.          Depression with anxiety  Stable, off medication.          Prediabetes  Check A1C.   Orders:  •  TSH, 3rd generation with Free T4 reflex; Future  •  Hemoglobin A1C; Future    Vitamin D deficiency  Recommend taking vit d3 2000 units daily.   Orders:  •  Calcium Carb-Cholecalciferol (calcium carbonate-vitamin D) 500 mg-5 mcg tablet; Take 1 tablet by mouth 2 (two) times a day with meals  •  Cholecalciferol 50 MCG (2000 UT) CAPS; Take 1 capsule (2,000 Units total) by mouth daily    Declines mammogram.        History of Present Illness   Dasha is here today to establish care.   Last saw her PCP in March.   She is a retired nurse.     She has hypertension. She takes losartan. She doesn't check her BP at home.     She has a history of depression when her daughter passed away. She is doing well, off all medications.       Review of Systems   Constitutional:  Negative for activity change, appetite change, fatigue and unexpected weight change.   Eyes:  Negative for visual disturbance.   Respiratory:  Negative for cough and shortness of breath.    Cardiovascular:  Negative for chest pain, palpitations and leg swelling.   Gastrointestinal:  Negative for abdominal pain, blood in stool, constipation and diarrhea.   Genitourinary:  Negative for difficulty urinating.   Musculoskeletal:   Negative for arthralgias.   Skin:  Negative for rash.   Neurological:  Negative for dizziness, weakness, light-headedness and headaches.   Psychiatric/Behavioral:  Negative for sleep disturbance.        Objective   /84   Pulse 73   Temp (!) 97 °F (36.1 °C)   Ht 5' (1.524 m)   Wt 95.3 kg (210 lb)   SpO2 98%   BMI 41.01 kg/m²      Physical Exam  Vitals reviewed.   Constitutional:       Appearance: Normal appearance. She is well-developed.   HENT:      Head: Normocephalic and atraumatic.     Eyes:      Conjunctiva/sclera: Conjunctivae normal.       Cardiovascular:      Rate and Rhythm: Normal rate and regular rhythm.      Heart sounds: Normal heart sounds.   Pulmonary:      Effort: Pulmonary effort is normal.      Breath sounds: Normal breath sounds.   Abdominal:      General: Bowel sounds are normal.      Palpations: Abdomen is soft.     Musculoskeletal:         General: Normal range of motion.      Right lower leg: No edema.      Left lower leg: No edema.     Skin:     General: Skin is warm and dry.     Neurological:      Mental Status: She is alert and oriented to person, place, and time.     Psychiatric:         Mood and Affect: Mood normal.         Behavior: Behavior normal.

## 2025-06-11 ENCOUNTER — TELEPHONE (OUTPATIENT)
Dept: INTERNAL MEDICINE CLINIC | Facility: CLINIC | Age: 70
End: 2025-06-11

## 2025-06-11 ENCOUNTER — RESULTS FOLLOW-UP (OUTPATIENT)
Dept: INTERNAL MEDICINE CLINIC | Facility: CLINIC | Age: 70
End: 2025-06-11

## 2025-06-11 DIAGNOSIS — R21 RASH: Primary | ICD-10-CM

## 2025-06-11 RX ORDER — BETAMETHASONE DIPROPIONATE 0.5 MG/G
CREAM TOPICAL 2 TIMES DAILY
Qty: 45 G | Refills: 0 | Status: SHIPPED | OUTPATIENT
Start: 2025-06-11

## 2025-06-11 NOTE — TELEPHONE ENCOUNTER
Patient called the RX Refill Line. Message is being forwarded to the office.     Patient is requesting a low dose of vitamin C and betamethasone diproprionate 0.05% cream. Patient states it was to be sent yesterday but it wasn't. Patient wants it to go to [facility] that is in the chart     Please contact patient at [phone]

## 2025-06-11 NOTE — TELEPHONE ENCOUNTER
These medications are not listed in her chart and weren't discussed yesterday. What dose of vit C is she taking? And I need a reason for the cream to put on the prescription.

## 2025-06-11 NOTE — TELEPHONE ENCOUNTER
Patient is not taking vitamin C. She stated she was told yesterday that Vitamin C and vitamin D would be sent to the pharmacy.   Patient states reason for cream is she gets a rash after spinal cord injections and needs to put the cream on her back.

## 2025-07-14 ENCOUNTER — TELEPHONE (OUTPATIENT)
Dept: ADMINISTRATIVE | Facility: OTHER | Age: 70
End: 2025-07-14

## 2025-07-14 NOTE — TELEPHONE ENCOUNTER
----- Message from Ileana ISABEL sent at 7/11/2025 11:18 AM EDT -----  Regarding: Care Gap Request  07/11/25 11:18 AM    Hello, our patient has had DEXA Scan completed/performed. Please assist in updating the patient chart by pulling the document from imaging Tab within Chart Review. The date of service is 12/4/2024.     Thank you,  Ileana Madison  [facility] INTERNAL MED

## 2025-07-14 NOTE — TELEPHONE ENCOUNTER
Upon review of the In Basket request we were able to locate, review, and update the patient chart as requested for CRC: Colonoscopy and DEXA Scan.    Any additional questions or concerns should be emailed to the Practice Liaisons via the appropriate education email address, please do not reply via In Basket.    Thank you  Ofelia West MA   [facility]

## 2025-07-14 NOTE — TELEPHONE ENCOUNTER
----- Message from Ileana ISABEL sent at 7/11/2025 11:24 AM EDT -----  Regarding: Care Gap Request  07/11/25 11:24 AM    Hello, our patient has had CRC: Colonoscopy completed/performed. Please assist in updating the patient chart by pulling the document from encounter Tab within Chart Review. The date of service is 11/9/2016.     Thank you,  Ileana Madison  [facility] INTERNAL MED